# Patient Record
Sex: FEMALE | Race: WHITE | NOT HISPANIC OR LATINO
[De-identification: names, ages, dates, MRNs, and addresses within clinical notes are randomized per-mention and may not be internally consistent; named-entity substitution may affect disease eponyms.]

---

## 2017-02-27 ENCOUNTER — APPOINTMENT (OUTPATIENT)
Dept: GASTROENTEROLOGY | Facility: CLINIC | Age: 72
End: 2017-02-27

## 2017-02-27 VITALS
SYSTOLIC BLOOD PRESSURE: 120 MMHG | WEIGHT: 162 LBS | HEIGHT: 61 IN | TEMPERATURE: 98.1 F | BODY MASS INDEX: 30.58 KG/M2 | DIASTOLIC BLOOD PRESSURE: 80 MMHG

## 2017-02-27 DIAGNOSIS — Z78.9 OTHER SPECIFIED HEALTH STATUS: ICD-10-CM

## 2017-02-27 DIAGNOSIS — E86.0 DEHYDRATION: ICD-10-CM

## 2017-02-27 DIAGNOSIS — Z82.62 FAMILY HISTORY OF OSTEOPOROSIS: ICD-10-CM

## 2017-02-28 ENCOUNTER — TRANSCRIPTION ENCOUNTER (OUTPATIENT)
Age: 72
End: 2017-02-28

## 2017-03-16 ENCOUNTER — APPOINTMENT (OUTPATIENT)
Dept: GASTROENTEROLOGY | Facility: AMBULATORY MEDICAL SERVICES | Age: 72
End: 2017-03-16

## 2017-09-29 ENCOUNTER — EMERGENCY (EMERGENCY)
Facility: HOSPITAL | Age: 72
LOS: 1 days | Discharge: HOME | End: 2017-09-29
Attending: EMERGENCY MEDICINE | Admitting: EMERGENCY MEDICINE
Payer: MEDICARE

## 2017-09-29 VITALS
DIASTOLIC BLOOD PRESSURE: 68 MMHG | OXYGEN SATURATION: 97 % | HEART RATE: 62 BPM | SYSTOLIC BLOOD PRESSURE: 184 MMHG | RESPIRATION RATE: 18 BRPM

## 2017-09-29 VITALS
TEMPERATURE: 98 F | HEART RATE: 66 BPM | SYSTOLIC BLOOD PRESSURE: 189 MMHG | OXYGEN SATURATION: 100 % | DIASTOLIC BLOOD PRESSURE: 59 MMHG | RESPIRATION RATE: 16 BRPM

## 2017-09-29 DIAGNOSIS — Z90.710 ACQUIRED ABSENCE OF BOTH CERVIX AND UTERUS: Chronic | ICD-10-CM

## 2017-09-29 DIAGNOSIS — Z98.89 OTHER SPECIFIED POSTPROCEDURAL STATES: Chronic | ICD-10-CM

## 2017-09-29 DIAGNOSIS — Z90.49 ACQUIRED ABSENCE OF OTHER SPECIFIED PARTS OF DIGESTIVE TRACT: Chronic | ICD-10-CM

## 2017-09-29 DIAGNOSIS — Z96.659 PRESENCE OF UNSPECIFIED ARTIFICIAL KNEE JOINT: Chronic | ICD-10-CM

## 2017-09-29 LAB
ALBUMIN SERPL ELPH-MCNC: 4.3 G/DL — SIGNIFICANT CHANGE UP (ref 3.3–5)
ALP SERPL-CCNC: 62 U/L — SIGNIFICANT CHANGE UP (ref 40–120)
ALT FLD-CCNC: 35 U/L — HIGH (ref 4–33)
APPEARANCE UR: CLEAR — SIGNIFICANT CHANGE UP
AST SERPL-CCNC: 30 U/L — SIGNIFICANT CHANGE UP (ref 4–32)
BASE EXCESS BLDV CALC-SCNC: 5 MMOL/L — SIGNIFICANT CHANGE UP
BASOPHILS # BLD AUTO: 0.04 K/UL — SIGNIFICANT CHANGE UP (ref 0–0.2)
BASOPHILS NFR BLD AUTO: 0.6 % — SIGNIFICANT CHANGE UP (ref 0–2)
BILIRUB SERPL-MCNC: 0.3 MG/DL — SIGNIFICANT CHANGE UP (ref 0.2–1.2)
BILIRUB UR-MCNC: NEGATIVE — SIGNIFICANT CHANGE UP
BLOOD GAS VENOUS - CREATININE: 0.66 MG/DL — SIGNIFICANT CHANGE UP (ref 0.5–1.3)
BLOOD UR QL VISUAL: NEGATIVE — SIGNIFICANT CHANGE UP
BUN SERPL-MCNC: 16 MG/DL — SIGNIFICANT CHANGE UP (ref 7–23)
CALCIUM SERPL-MCNC: 9.3 MG/DL — SIGNIFICANT CHANGE UP (ref 8.4–10.5)
CHLORIDE BLDV-SCNC: 105 MMOL/L — SIGNIFICANT CHANGE UP (ref 96–108)
CHLORIDE SERPL-SCNC: 101 MMOL/L — SIGNIFICANT CHANGE UP (ref 98–107)
CO2 SERPL-SCNC: 29 MMOL/L — SIGNIFICANT CHANGE UP (ref 22–31)
COLOR SPEC: SIGNIFICANT CHANGE UP
CREAT SERPL-MCNC: 0.86 MG/DL — SIGNIFICANT CHANGE UP (ref 0.5–1.3)
EOSINOPHIL # BLD AUTO: 0.13 K/UL — SIGNIFICANT CHANGE UP (ref 0–0.5)
EOSINOPHIL NFR BLD AUTO: 1.8 % — SIGNIFICANT CHANGE UP (ref 0–6)
GAS PNL BLDV: 143 MMOL/L — SIGNIFICANT CHANGE UP (ref 136–146)
GLUCOSE BLDV-MCNC: 104 — HIGH (ref 70–99)
GLUCOSE SERPL-MCNC: 99 MG/DL — SIGNIFICANT CHANGE UP (ref 70–99)
GLUCOSE UR-MCNC: NEGATIVE — SIGNIFICANT CHANGE UP
HCO3 BLDV-SCNC: 26 MMOL/L — SIGNIFICANT CHANGE UP (ref 20–27)
HCT VFR BLD CALC: 36.7 % — SIGNIFICANT CHANGE UP (ref 34.5–45)
HCT VFR BLDV CALC: 56.1 % — HIGH (ref 34.5–45)
HGB BLD-MCNC: 12 G/DL — SIGNIFICANT CHANGE UP (ref 11.5–15.5)
HGB BLDV-MCNC: 18.7 G/DL — HIGH (ref 11.5–15.5)
IMM GRANULOCYTES # BLD AUTO: 0.02 # — SIGNIFICANT CHANGE UP
IMM GRANULOCYTES NFR BLD AUTO: 0.3 % — SIGNIFICANT CHANGE UP (ref 0–1.5)
KETONES UR-MCNC: NEGATIVE — SIGNIFICANT CHANGE UP
LACTATE BLDV-MCNC: 1.2 MMOL/L — SIGNIFICANT CHANGE UP (ref 0.5–2)
LEUKOCYTE ESTERASE UR-ACNC: NEGATIVE — SIGNIFICANT CHANGE UP
LYMPHOCYTES # BLD AUTO: 1.9 K/UL — SIGNIFICANT CHANGE UP (ref 1–3.3)
LYMPHOCYTES # BLD AUTO: 26.8 % — SIGNIFICANT CHANGE UP (ref 13–44)
MCHC RBC-ENTMCNC: 30.3 PG — SIGNIFICANT CHANGE UP (ref 27–34)
MCHC RBC-ENTMCNC: 32.7 % — SIGNIFICANT CHANGE UP (ref 32–36)
MCV RBC AUTO: 92.7 FL — SIGNIFICANT CHANGE UP (ref 80–100)
MONOCYTES # BLD AUTO: 0.43 K/UL — SIGNIFICANT CHANGE UP (ref 0–0.9)
MONOCYTES NFR BLD AUTO: 6.1 % — SIGNIFICANT CHANGE UP (ref 2–14)
NEUTROPHILS # BLD AUTO: 4.56 K/UL — SIGNIFICANT CHANGE UP (ref 1.8–7.4)
NEUTROPHILS NFR BLD AUTO: 64.4 % — SIGNIFICANT CHANGE UP (ref 43–77)
NITRITE UR-MCNC: NEGATIVE — SIGNIFICANT CHANGE UP
NON-SQ EPI CELLS # UR AUTO: <1 — SIGNIFICANT CHANGE UP
NRBC # FLD: 0 — SIGNIFICANT CHANGE UP
PCO2 BLDV: 54 MMHG — HIGH (ref 41–51)
PH BLDV: 7.37 PH — SIGNIFICANT CHANGE UP (ref 7.32–7.43)
PH UR: 6 — SIGNIFICANT CHANGE UP (ref 4.6–8)
PLATELET # BLD AUTO: 198 K/UL — SIGNIFICANT CHANGE UP (ref 150–400)
PMV BLD: 12.2 FL — SIGNIFICANT CHANGE UP (ref 7–13)
PO2 BLDV: < 24 MMHG — LOW (ref 35–40)
POTASSIUM BLDV-SCNC: 4.3 MMOL/L — SIGNIFICANT CHANGE UP (ref 3.4–4.5)
POTASSIUM SERPL-MCNC: 4.5 MMOL/L — SIGNIFICANT CHANGE UP (ref 3.5–5.3)
POTASSIUM SERPL-SCNC: 4.5 MMOL/L — SIGNIFICANT CHANGE UP (ref 3.5–5.3)
PROT SERPL-MCNC: 6.9 G/DL — SIGNIFICANT CHANGE UP (ref 6–8.3)
PROT UR-MCNC: NEGATIVE — SIGNIFICANT CHANGE UP
RBC # BLD: 3.96 M/UL — SIGNIFICANT CHANGE UP (ref 3.8–5.2)
RBC # FLD: 12.9 % — SIGNIFICANT CHANGE UP (ref 10.3–14.5)
SAO2 % BLDV: 36 % — LOW (ref 60–85)
SODIUM SERPL-SCNC: 141 MMOL/L — SIGNIFICANT CHANGE UP (ref 135–145)
SP GR SPEC: 1 — SIGNIFICANT CHANGE UP (ref 1–1.03)
SQUAMOUS # UR AUTO: SIGNIFICANT CHANGE UP
UROBILINOGEN FLD QL: NORMAL E.U. — SIGNIFICANT CHANGE UP (ref 0.1–0.2)
WBC # BLD: 7.08 K/UL — SIGNIFICANT CHANGE UP (ref 3.8–10.5)
WBC # FLD AUTO: 7.08 K/UL — SIGNIFICANT CHANGE UP (ref 3.8–10.5)
WBC UR QL: SIGNIFICANT CHANGE UP (ref 0–?)

## 2017-09-29 PROCEDURE — 74177 CT ABD & PELVIS W/CONTRAST: CPT | Mod: 26

## 2017-09-29 PROCEDURE — 99284 EMERGENCY DEPT VISIT MOD MDM: CPT

## 2017-09-29 RX ORDER — ACETAMINOPHEN 500 MG
1000 TABLET ORAL ONCE
Qty: 0 | Refills: 0 | Status: COMPLETED | OUTPATIENT
Start: 2017-09-29 | End: 2017-09-29

## 2017-09-29 RX ORDER — MORPHINE SULFATE 50 MG/1
2 CAPSULE, EXTENDED RELEASE ORAL ONCE
Qty: 0 | Refills: 0 | Status: DISCONTINUED | OUTPATIENT
Start: 2017-09-29 | End: 2017-09-29

## 2017-09-29 RX ORDER — CYCLOBENZAPRINE HYDROCHLORIDE 10 MG/1
1 TABLET, FILM COATED ORAL
Qty: 10 | Refills: 0
Start: 2017-09-29 | End: 2017-10-04

## 2017-09-29 RX ORDER — ONDANSETRON 8 MG/1
4 TABLET, FILM COATED ORAL ONCE
Qty: 0 | Refills: 0 | Status: COMPLETED | OUTPATIENT
Start: 2017-09-29 | End: 2017-09-29

## 2017-09-29 RX ORDER — SODIUM CHLORIDE 9 MG/ML
1000 INJECTION INTRAMUSCULAR; INTRAVENOUS; SUBCUTANEOUS ONCE
Qty: 0 | Refills: 0 | Status: COMPLETED | OUTPATIENT
Start: 2017-09-29 | End: 2017-09-29

## 2017-09-29 RX ADMIN — MORPHINE SULFATE 2 MILLIGRAM(S): 50 CAPSULE, EXTENDED RELEASE ORAL at 15:10

## 2017-09-29 RX ADMIN — MORPHINE SULFATE 2 MILLIGRAM(S): 50 CAPSULE, EXTENDED RELEASE ORAL at 17:43

## 2017-09-29 RX ADMIN — MORPHINE SULFATE 2 MILLIGRAM(S): 50 CAPSULE, EXTENDED RELEASE ORAL at 16:06

## 2017-09-29 RX ADMIN — ONDANSETRON 4 MILLIGRAM(S): 8 TABLET, FILM COATED ORAL at 15:11

## 2017-09-29 RX ADMIN — Medication 400 MILLIGRAM(S): at 15:10

## 2017-09-29 RX ADMIN — Medication 1000 MILLIGRAM(S): at 16:06

## 2017-09-29 RX ADMIN — SODIUM CHLORIDE 1000 MILLILITER(S): 9 INJECTION INTRAMUSCULAR; INTRAVENOUS; SUBCUTANEOUS at 15:10

## 2017-09-29 NOTE — ED PROVIDER NOTE - MEDICAL DECISION MAKING DETAILS
Lt flank pain with radiation to LLQ as well as LBP - pt sent to er with concern for renal colic - bedsisde us neg for hydro stone, normal size aorta making colic and AAA diagnosis less likley therefore will get contrast ct abd, pain control, labs, iv hydration

## 2017-09-29 NOTE — ED PROVIDER NOTE - ATTENDING CONTRIBUTION TO CARE
Price: 72 yof with resolved diarrhea 3 days ago then with left sided abd pain radiating to the back intermittent, colicky pain, also radiating to lower abd, no fever, no nausea, no vomiting, no trauma, no chills, no urinary symptoms.  On exam, pt is well appearing pain intermittent, BP high, 189/59, clear lungs, normal cardiac, abd soft with left mid abd tn and left cvat.  no suprapubic tn.  US shows no hydronephrosis, and no aaa.  Labs, CT, pain control, r/o diverticulitits

## 2017-09-29 NOTE — ED PROCEDURE NOTE - PROCEDURE ADDITIONAL DETAILS
Limited aorta ultrasound shows no abdominal aortic aneurysm.  See images and report in chart.  Eve [id number]

## 2017-09-29 NOTE — ED PROVIDER NOTE - PLAN OF CARE
PLEASE TAKE PAIN MEDICATIONS AS NEEDED - YOU WILL MAY TAKE IBUPROFEN 600MG EVERY 8HRS FOR NO LONGER THAN 5 DAYS.  FOLLOW UP WITH YOUR DOCTOR WITHIN NEXT 48HRS, RETURN TO ER FOR WORSENING PAIN PLEASE TAKE PAIN MEDICATIONS AS NEEDED - YOU WILL MAY TAKE IBUPROFEN 600MG EVERY 8HRS FOR NO LONGER THAN 5 DAYS.  FOLLOW UP WITH YOUR DOCTOR WITHIN NEXT 48HRS, RETURN TO ER FOR WORSENING PAIN.

## 2017-09-29 NOTE — ED ADULT TRIAGE NOTE - CHIEF COMPLAINT QUOTE
Pt sent by [facility] with L flank area pain, nausea and L sided abd pain since last night.  Denies diarrhea, chills

## 2017-09-29 NOTE — ED PROCEDURE NOTE - PROCEDURE ADDITIONAL DETAILS
Limited Renal US shows no hydronephrosis bilaterally and a non distended bladder.  See images and report in chart.  Eve [id number]

## 2017-09-29 NOTE — ED PROVIDER NOTE - PMH
Back pain    Cholelithiasis    Chronic sinusitis  Deviated nasal septum  Colitis    Fibroids  Uterine  GERD (gastroesophageal reflux disease)    GI bleeding  recent 2/15  - [facility] , denies any blood transfusion  HTN (hypertension)    Lupus    Osteoarthritis

## 2017-09-29 NOTE — ED PROVIDER NOTE - CARE PLAN
Principal Discharge DX:	Flank pain  Instructions for follow-up, activity and diet:	PLEASE TAKE PAIN MEDICATIONS AS NEEDED - YOU WILL MAY TAKE IBUPROFEN 600MG EVERY 8HRS FOR NO LONGER THAN 5 DAYS.  FOLLOW UP WITH YOUR DOCTOR WITHIN NEXT 48HRS, RETURN TO ER FOR WORSENING PAIN Principal Discharge DX:	Flank pain  Instructions for follow-up, activity and diet:	PLEASE TAKE PAIN MEDICATIONS AS NEEDED - YOU WILL MAY TAKE IBUPROFEN 600MG EVERY 8HRS FOR NO LONGER THAN 5 DAYS.  FOLLOW UP WITH YOUR DOCTOR WITHIN NEXT 48HRS, RETURN TO ER FOR WORSENING PAIN.

## 2017-09-29 NOTE — ED PROVIDER NOTE - OBJECTIVE STATEMENT
Pt is 73 y/o female with Pmxh of HTN here for eval of Lt flank pain x 2 days. Pt states that she initially had diarrhea 3 days ago - had few episodes of NB, non-mucousy diarrhea, diarrhea subsided, pt Pt is 71 y/o female with Pmxh of HTN here for eval of Lt flank pain x 2 days. Pt states that she initially had diarrhea 3 days ago - had few episodes of NB, non-mucousy diarrhea, diarrhea subsided, pt started experiencing Lt flank pain - sharp, colicky, radiating to LLQ as well as lower back. Pt denies dysuria, denies urinary urgency or frequency, denies hematuria or hx of kidney stones; pt has been feeling nauseas but denies vomiting, denies fever, chills, cp, sob, denies ext weakness or numbness. pt was seen and evaluated at  today and was sent to er with concern for renal colic. no pain meds taken pta. pcp - Dr Taylor,.

## 2019-08-03 ENCOUNTER — TRANSCRIPTION ENCOUNTER (OUTPATIENT)
Age: 74
End: 2019-08-03

## 2019-09-09 ENCOUNTER — APPOINTMENT (OUTPATIENT)
Dept: GASTROENTEROLOGY | Facility: CLINIC | Age: 74
End: 2019-09-09
Payer: MEDICARE

## 2019-09-09 VITALS
SYSTOLIC BLOOD PRESSURE: 120 MMHG | OXYGEN SATURATION: 98 % | TEMPERATURE: 98.8 F | BODY MASS INDEX: 32.47 KG/M2 | HEART RATE: 72 BPM | DIASTOLIC BLOOD PRESSURE: 70 MMHG | WEIGHT: 172 LBS | HEIGHT: 61 IN

## 2019-09-09 PROCEDURE — 99214 OFFICE O/P EST MOD 30 MIN: CPT

## 2019-09-09 RX ORDER — HYDROCODONE BITARTRATE AND IBUPROFEN 7.5; 2 MG/1; MG/1
7.5-2 TABLET ORAL
Qty: 20 | Refills: 0 | Status: DISCONTINUED | COMMUNITY
Start: 2016-10-31 | End: 2019-09-09

## 2019-09-09 RX ORDER — METRONIDAZOLE 500 MG/1
500 TABLET ORAL TWICE DAILY
Qty: 20 | Refills: 1 | Status: DISCONTINUED | COMMUNITY
Start: 2017-02-27 | End: 2019-09-09

## 2019-09-09 RX ORDER — CIPROFLOXACIN HYDROCHLORIDE 500 MG/1
500 TABLET, FILM COATED ORAL
Qty: 20 | Refills: 1 | Status: DISCONTINUED | COMMUNITY
Start: 2017-02-27 | End: 2019-09-09

## 2019-09-09 RX ORDER — NAPROXEN 500 MG/1
500 TABLET ORAL
Qty: 20 | Refills: 0 | Status: COMPLETED | COMMUNITY
Start: 2016-10-23 | End: 2019-09-09

## 2019-09-09 NOTE — PHYSICAL EXAM
[General Appearance - Alert] : alert [General Appearance - In No Acute Distress] : in no acute distress [Neck Appearance] : the appearance of the neck was normal [Neck Cervical Mass (___cm)] : no neck mass was observed [Thyroid Diffuse Enlargement] : the thyroid was not enlarged [Jugular Venous Distention Increased] : there was no jugular-venous distention [Thyroid Nodule] : there were no palpable thyroid nodules [Auscultation Breath Sounds / Voice Sounds] : lungs were clear to auscultation bilaterally [Heart Rate And Rhythm] : heart rate was normal and rhythm regular [Heart Sounds] : normal S1 and S2 [Heart Sounds Gallop] : no gallops [Murmurs] : no murmurs [Heart Sounds Pericardial Friction Rub] : no pericardial rub [Bowel Sounds] : normal bowel sounds [Abdomen Soft] : soft [Abdomen Tenderness] : non-tender [] : no hepato-splenomegaly [Abdomen Mass (___ Cm)] : no abdominal mass palpated [Cervical Lymph Nodes Enlarged Posterior Bilaterally] : posterior cervical [Supraclavicular Lymph Nodes Enlarged Bilaterally] : supraclavicular [Cervical Lymph Nodes Enlarged Anterior Bilaterally] : anterior cervical [No CVA Tenderness] : no ~M costovertebral angle tenderness [No Spinal Tenderness] : no spinal tenderness [Nail Clubbing] : no clubbing  or cyanosis of the fingernails [Abnormal Walk] : normal gait [Musculoskeletal - Swelling] : no joint swelling seen [Motor Tone] : muscle strength and tone were normal

## 2019-09-09 NOTE — HISTORY OF PRESENT ILLNESS
[de-identified] : 74 year old woman complains of 2 monThs of rectal bleeding in the bowel and the tissue paper . Happens usually in AM bowel movement. She has also experienced poor control of her bowel movements. Colonoscopy in 2017 - 2 small polyps were removed. She had an episode of ischemic colitis in 2017.

## 2019-09-23 ENCOUNTER — APPOINTMENT (OUTPATIENT)
Dept: GASTROENTEROLOGY | Facility: CLINIC | Age: 74
End: 2019-09-23
Payer: MEDICARE

## 2019-09-23 VITALS
RESPIRATION RATE: 17 BRPM | BODY MASS INDEX: 32.1 KG/M2 | SYSTOLIC BLOOD PRESSURE: 120 MMHG | HEIGHT: 61 IN | DIASTOLIC BLOOD PRESSURE: 79 MMHG | TEMPERATURE: 98.5 F | HEART RATE: 84 BPM | OXYGEN SATURATION: 97 % | WEIGHT: 170 LBS

## 2019-09-23 DIAGNOSIS — R19.7 DIARRHEA, UNSPECIFIED: ICD-10-CM

## 2019-09-23 PROCEDURE — 99213 OFFICE O/P EST LOW 20 MIN: CPT

## 2019-09-23 NOTE — HISTORY OF PRESENT ILLNESS
[de-identified] : 74 year old woman seen for bloody diarrhea . She completed the course of PO Cipro and Flagyl. The diarrhea and bleeding have resolved. She is feeling better and is having regular bowel movements. Stool cultures are negative.

## 2019-09-23 NOTE — PHYSICAL EXAM
[General Appearance - Alert] : alert [General Appearance - In No Acute Distress] : in no acute distress [Neck Appearance] : the appearance of the neck was normal [Neck Cervical Mass (___cm)] : no neck mass was observed [Jugular Venous Distention Increased] : there was no jugular-venous distention [Thyroid Diffuse Enlargement] : the thyroid was not enlarged [Thyroid Nodule] : there were no palpable thyroid nodules [Auscultation Breath Sounds / Voice Sounds] : lungs were clear to auscultation bilaterally [Heart Rate And Rhythm] : heart rate was normal and rhythm regular [Heart Sounds] : normal S1 and S2 [Heart Sounds Gallop] : no gallops [Heart Sounds Pericardial Friction Rub] : no pericardial rub [Murmurs] : no murmurs [Abdomen Soft] : soft [Bowel Sounds] : normal bowel sounds [Abdomen Tenderness] : non-tender [] : no hepato-splenomegaly [Abdomen Mass (___ Cm)] : no abdominal mass palpated [Cervical Lymph Nodes Enlarged Posterior Bilaterally] : posterior cervical [Cervical Lymph Nodes Enlarged Anterior Bilaterally] : anterior cervical [Supraclavicular Lymph Nodes Enlarged Bilaterally] : supraclavicular [No CVA Tenderness] : no ~M costovertebral angle tenderness [No Spinal Tenderness] : no spinal tenderness

## 2020-07-09 ENCOUNTER — APPOINTMENT (OUTPATIENT)
Dept: GASTROENTEROLOGY | Facility: CLINIC | Age: 75
End: 2020-07-09
Payer: MEDICARE

## 2020-07-09 VITALS
SYSTOLIC BLOOD PRESSURE: 132 MMHG | OXYGEN SATURATION: 96 % | TEMPERATURE: 98.8 F | HEART RATE: 74 BPM | DIASTOLIC BLOOD PRESSURE: 79 MMHG | HEIGHT: 61 IN | WEIGHT: 170 LBS | BODY MASS INDEX: 32.1 KG/M2 | RESPIRATION RATE: 17 BRPM

## 2020-07-09 DIAGNOSIS — K55.9 VASCULAR DISORDER OF INTESTINE, UNSPECIFIED: ICD-10-CM

## 2020-07-09 DIAGNOSIS — R10.9 UNSPECIFIED ABDOMINAL PAIN: ICD-10-CM

## 2020-07-09 DIAGNOSIS — K52.9 NONINFECTIVE GASTROENTERITIS AND COLITIS, UNSPECIFIED: ICD-10-CM

## 2020-07-09 PROCEDURE — 99214 OFFICE O/P EST MOD 30 MIN: CPT

## 2020-07-09 NOTE — HISTORY OF PRESENT ILLNESS
[de-identified] : 75 year old woman with intermittent rectal bleeding for the past 21/2 months. Usually it is a small amount of bleeding but occasionally it is significant. This is associated with periumbilical pain. she previously had rectal bleeding in 2017 due to ischemic colitis.  She denies fever , chills, melena or hematemesis.

## 2020-07-09 NOTE — PHYSICAL EXAM
[General Appearance - In No Acute Distress] : in no acute distress [General Appearance - Alert] : alert [Neck Appearance] : the appearance of the neck was normal [Neck Cervical Mass (___cm)] : no neck mass was observed [Jugular Venous Distention Increased] : there was no jugular-venous distention [Thyroid Diffuse Enlargement] : the thyroid was not enlarged [Thyroid Nodule] : there were no palpable thyroid nodules [Auscultation Breath Sounds / Voice Sounds] : lungs were clear to auscultation bilaterally [Heart Rate And Rhythm] : heart rate was normal and rhythm regular [Heart Sounds] : normal S1 and S2 [Heart Sounds Gallop] : no gallops [Murmurs] : no murmurs [Bowel Sounds] : normal bowel sounds [Heart Sounds Pericardial Friction Rub] : no pericardial rub [Abdomen Soft] : soft [] : no hepato-splenomegaly [Abdomen Tenderness] : non-tender [Abdomen Mass (___ Cm)] : no abdominal mass palpated [Cervical Lymph Nodes Enlarged Posterior Bilaterally] : posterior cervical [Cervical Lymph Nodes Enlarged Anterior Bilaterally] : anterior cervical [Supraclavicular Lymph Nodes Enlarged Bilaterally] : supraclavicular [No Spinal Tenderness] : no spinal tenderness [No CVA Tenderness] : no ~M costovertebral angle tenderness [Normal Sphincter Tone] : normal sphincter tone [No Rectal Mass] : no rectal mass [Occult Blood Positive] : stool was negative for occult blood

## 2020-07-16 LAB — HEMOCCULT STL QL IA: POSITIVE

## 2020-07-26 ENCOUNTER — APPOINTMENT (OUTPATIENT)
Dept: DISASTER EMERGENCY | Facility: CLINIC | Age: 75
End: 2020-07-26

## 2020-07-26 ENCOUNTER — LABORATORY RESULT (OUTPATIENT)
Age: 75
End: 2020-07-26

## 2020-07-29 ENCOUNTER — APPOINTMENT (OUTPATIENT)
Dept: GASTROENTEROLOGY | Facility: HOSPITAL | Age: 75
End: 2020-07-29
Payer: MEDICARE

## 2020-07-29 ENCOUNTER — RESULT REVIEW (OUTPATIENT)
Age: 75
End: 2020-07-29

## 2020-07-29 ENCOUNTER — OUTPATIENT (OUTPATIENT)
Dept: OUTPATIENT SERVICES | Facility: HOSPITAL | Age: 75
LOS: 1 days | Discharge: HOME | End: 2020-07-29
Payer: MEDICARE

## 2020-07-29 VITALS
DIASTOLIC BLOOD PRESSURE: 76 MMHG | RESPIRATION RATE: 14 BRPM | WEIGHT: 169.98 LBS | SYSTOLIC BLOOD PRESSURE: 125 MMHG | HEART RATE: 71 BPM | OXYGEN SATURATION: 97 % | HEIGHT: 61 IN | TEMPERATURE: 97 F

## 2020-07-29 VITALS
RESPIRATION RATE: 14 BRPM | OXYGEN SATURATION: 96 % | SYSTOLIC BLOOD PRESSURE: 106 MMHG | DIASTOLIC BLOOD PRESSURE: 64 MMHG | HEART RATE: 67 BPM

## 2020-07-29 DIAGNOSIS — Z98.89 OTHER SPECIFIED POSTPROCEDURAL STATES: Chronic | ICD-10-CM

## 2020-07-29 DIAGNOSIS — Z90.710 ACQUIRED ABSENCE OF BOTH CERVIX AND UTERUS: Chronic | ICD-10-CM

## 2020-07-29 DIAGNOSIS — Z96.659 PRESENCE OF UNSPECIFIED ARTIFICIAL KNEE JOINT: Chronic | ICD-10-CM

## 2020-07-29 DIAGNOSIS — Z90.49 ACQUIRED ABSENCE OF OTHER SPECIFIED PARTS OF DIGESTIVE TRACT: Chronic | ICD-10-CM

## 2020-07-29 DIAGNOSIS — R10.9 UNSPECIFIED ABDOMINAL PAIN: ICD-10-CM

## 2020-07-29 PROCEDURE — 88341 IMHCHEM/IMCYTCHM EA ADD ANTB: CPT | Mod: 26

## 2020-07-29 PROCEDURE — 88342 IMHCHEM/IMCYTCHM 1ST ANTB: CPT | Mod: 26

## 2020-07-29 PROCEDURE — 88305 TISSUE EXAM BY PATHOLOGIST: CPT | Mod: 26

## 2020-07-29 PROCEDURE — 43239 EGD BIOPSY SINGLE/MULTIPLE: CPT

## 2020-07-29 PROCEDURE — 88312 SPECIAL STAINS GROUP 1: CPT | Mod: 26

## 2020-07-29 PROCEDURE — 45380 COLONOSCOPY AND BIOPSY: CPT

## 2020-07-29 RX ORDER — SODIUM CHLORIDE 9 MG/ML
1000 INJECTION, SOLUTION INTRAVENOUS
Refills: 0 | Status: DISCONTINUED | OUTPATIENT
Start: 2020-07-29 | End: 2020-08-13

## 2020-07-29 NOTE — ASU PATIENT PROFILE, ADULT - PSH
H/O dilation and curettage  2002  H/O sinus surgery  times 2 ; 2002, 2004  H/O total hysterectomy    H/O total knee replacement  left 6/2011  History of appendectomy    S/P cholecystectomy

## 2020-07-29 NOTE — ASU PATIENT PROFILE, ADULT - VISION (WITH CORRECTIVE LENSES IF THE PATIENT USUALLY WEARS THEM):
Partially impaired: cannot see medication labels or newsprint, but can see obstacles in path, and the surrounding layout; can count fingers at arm's length/GLASSES  FARSIGHTED

## 2020-08-05 LAB — SURGICAL PATHOLOGY STUDY: SIGNIFICANT CHANGE UP

## 2020-08-12 RX ORDER — CIPROFLOXACIN HYDROCHLORIDE 500 MG/1
500 TABLET, FILM COATED ORAL
Qty: 20 | Refills: 1 | Status: COMPLETED | COMMUNITY
Start: 2020-07-09 | End: 2020-08-12

## 2020-08-12 RX ORDER — CIPROFLOXACIN HYDROCHLORIDE 500 MG/1
500 TABLET, FILM COATED ORAL
Qty: 20 | Refills: 1 | Status: COMPLETED | COMMUNITY
Start: 2019-09-09 | End: 2020-08-12

## 2020-08-12 RX ORDER — METRONIDAZOLE 250 MG/1
250 TABLET ORAL 3 TIMES DAILY
Qty: 30 | Refills: 1 | Status: COMPLETED | COMMUNITY
Start: 2019-09-09 | End: 2020-08-12

## 2020-08-12 RX ORDER — METRONIDAZOLE 250 MG/1
250 TABLET ORAL 3 TIMES DAILY
Qty: 30 | Refills: 1 | Status: COMPLETED | COMMUNITY
Start: 2020-07-09 | End: 2020-08-12

## 2020-08-17 ENCOUNTER — APPOINTMENT (OUTPATIENT)
Dept: GASTROENTEROLOGY | Facility: CLINIC | Age: 75
End: 2020-08-17
Payer: MEDICARE

## 2020-08-17 VITALS
DIASTOLIC BLOOD PRESSURE: 82 MMHG | TEMPERATURE: 99 F | HEIGHT: 61 IN | WEIGHT: 169 LBS | OXYGEN SATURATION: 96 % | HEART RATE: 78 BPM | SYSTOLIC BLOOD PRESSURE: 136 MMHG | BODY MASS INDEX: 31.91 KG/M2

## 2020-08-17 DIAGNOSIS — Z09 ENCOUNTER FOR FOLLOW-UP EXAMINATION AFTER COMPLETED TREATMENT FOR CONDITIONS OTHER THAN MALIGNANT NEOPLASM: ICD-10-CM

## 2020-08-17 DIAGNOSIS — K31.7 POLYP OF STOMACH AND DUODENUM: ICD-10-CM

## 2020-08-17 DIAGNOSIS — K62.1 RECTAL POLYP: ICD-10-CM

## 2020-08-17 PROCEDURE — 99213 OFFICE O/P EST LOW 20 MIN: CPT

## 2020-08-17 RX ORDER — SODIUM SULFATE, POTASSIUM SULFATE, MAGNESIUM SULFATE 17.5; 3.13; 1.6 G/ML; G/ML; G/ML
17.5-3.13-1.6 SOLUTION, CONCENTRATE ORAL
Qty: 1 | Refills: 0 | Status: DISCONTINUED | COMMUNITY
Start: 2020-07-09 | End: 2020-08-17

## 2020-08-17 NOTE — ASSESSMENT
[FreeTextEntry1] : Patient referred to Dr. Merrill for rectal EUS and gastric EUS. Discussed with the patient.

## 2020-08-17 NOTE — HISTORY OF PRESENT ILLNESS
[de-identified] : 89 year old woman with tectal bleeding. She  underwent colonoscopy on 7/29 that showed a rectal mass. Biopsy is positive for high grade dysplasia. EGD shoes benign gastric polyps but possible gastric mass. Biopsy is negative. She has a small amount of rectal bleeding when she wipes.

## 2020-08-26 DIAGNOSIS — Z01.818 ENCOUNTER FOR OTHER PREPROCEDURAL EXAMINATION: ICD-10-CM

## 2020-08-28 ENCOUNTER — APPOINTMENT (OUTPATIENT)
Dept: DISASTER EMERGENCY | Facility: CLINIC | Age: 75
End: 2020-08-28

## 2020-08-29 LAB — SARS-COV-2 N GENE NPH QL NAA+PROBE: NOT DETECTED

## 2020-08-31 ENCOUNTER — OUTPATIENT (OUTPATIENT)
Dept: OUTPATIENT SERVICES | Facility: HOSPITAL | Age: 75
LOS: 1 days | End: 2020-08-31
Payer: MEDICARE

## 2020-08-31 ENCOUNTER — RESULT REVIEW (OUTPATIENT)
Age: 75
End: 2020-08-31

## 2020-08-31 ENCOUNTER — APPOINTMENT (OUTPATIENT)
Dept: GASTROENTEROLOGY | Facility: HOSPITAL | Age: 75
End: 2020-08-31

## 2020-08-31 VITALS
DIASTOLIC BLOOD PRESSURE: 67 MMHG | HEART RATE: 71 BPM | WEIGHT: 169.98 LBS | HEIGHT: 61 IN | TEMPERATURE: 97 F | RESPIRATION RATE: 17 BRPM | SYSTOLIC BLOOD PRESSURE: 152 MMHG | OXYGEN SATURATION: 97 %

## 2020-08-31 VITALS
RESPIRATION RATE: 18 BRPM | DIASTOLIC BLOOD PRESSURE: 77 MMHG | HEART RATE: 64 BPM | SYSTOLIC BLOOD PRESSURE: 113 MMHG | OXYGEN SATURATION: 97 %

## 2020-08-31 DIAGNOSIS — Z98.89 OTHER SPECIFIED POSTPROCEDURAL STATES: Chronic | ICD-10-CM

## 2020-08-31 DIAGNOSIS — Z90.49 ACQUIRED ABSENCE OF OTHER SPECIFIED PARTS OF DIGESTIVE TRACT: Chronic | ICD-10-CM

## 2020-08-31 DIAGNOSIS — Z90.710 ACQUIRED ABSENCE OF BOTH CERVIX AND UTERUS: Chronic | ICD-10-CM

## 2020-08-31 DIAGNOSIS — Z96.659 PRESENCE OF UNSPECIFIED ARTIFICIAL KNEE JOINT: Chronic | ICD-10-CM

## 2020-08-31 DIAGNOSIS — K62.5 HEMORRHAGE OF ANUS AND RECTUM: ICD-10-CM

## 2020-08-31 PROCEDURE — 88312 SPECIAL STAINS GROUP 1: CPT | Mod: 26

## 2020-08-31 PROCEDURE — 45391 COLONOSCOPY W/ENDOSCOPE US: CPT | Mod: XU

## 2020-08-31 PROCEDURE — 43259 EGD US EXAM DUODENUM/JEJUNUM: CPT | Mod: XU

## 2020-08-31 PROCEDURE — 45341 SIGMOIDOSCOPY W/ULTRASOUND: CPT | Mod: GC

## 2020-08-31 PROCEDURE — 88312 SPECIAL STAINS GROUP 1: CPT

## 2020-08-31 PROCEDURE — 43239 EGD BIOPSY SINGLE/MULTIPLE: CPT | Mod: 59

## 2020-08-31 PROCEDURE — 88341 IMHCHEM/IMCYTCHM EA ADD ANTB: CPT

## 2020-08-31 PROCEDURE — 88305 TISSUE EXAM BY PATHOLOGIST: CPT | Mod: 26

## 2020-08-31 PROCEDURE — 43254 EGD ENDO MUCOSAL RESECTION: CPT

## 2020-08-31 PROCEDURE — 43254 EGD ENDO MUCOSAL RESECTION: CPT | Mod: GC

## 2020-08-31 PROCEDURE — 88305 TISSUE EXAM BY PATHOLOGIST: CPT

## 2020-08-31 PROCEDURE — 88342 IMHCHEM/IMCYTCHM 1ST ANTB: CPT | Mod: 26

## 2020-08-31 PROCEDURE — 45380 COLONOSCOPY AND BIOPSY: CPT

## 2020-08-31 PROCEDURE — 88341 IMHCHEM/IMCYTCHM EA ADD ANTB: CPT | Mod: 26

## 2020-08-31 PROCEDURE — 43259 EGD US EXAM DUODENUM/JEJUNUM: CPT | Mod: GC

## 2020-08-31 PROCEDURE — 45331 SIGMOIDOSCOPY AND BIOPSY: CPT | Mod: GC,59

## 2020-08-31 NOTE — PRE-ANESTHESIA EVALUATION ADULT - NSANTHOSAYNRD_GEN_A_CORE
No. MARCELA screening performed.  STOP BANG Legend: 0-2 = LOW Risk; 3-4 = INTERMEDIATE Risk; 5-8 = HIGH Risk

## 2020-08-31 NOTE — PRE PROCEDURE NOTE - PRE PROCEDURE EVALUATION
Attending Physician:            FARRAH                Procedure: EUS (upper and lower) and rectal EMR    Indication for Procedure: gastric cardia polyp and rectal lesion.  ________________________________________________________  PAST MEDICAL & SURGICAL HISTORY:  Fibroids: Uterine  Chronic sinusitis: Deviated nasal septum  Cholelithiasis  Osteoarthritis  GERD (gastroesophageal reflux disease)  Back pain  GI bleeding: recent 2/15  - [facility] , denies any blood transfusion  Colitis  Lupus  HTN (hypertension)  H/O dilation and curettage: 2002  H/O sinus surgery: times 2 ; 2002, 2004  H/O total knee replacement: left 6/2011  H/O total hysterectomy  S/P cholecystectomy  History of appendectomy    ALLERGIES:  codeine (Vomiting)  penicillin (Hives)    HOME MEDICATIONS:  acetaminophen-oxyCODONE 325 mg-5 mg oral tablet: 2 tab(s) orally every 4 hours, As needed, Moderate Pain  Bystolic 5 mg oral tablet: 1 tab(s) orally once a day in am  quinapril 40 mg oral tablet: 1 tab(s) orally once a day in am    AICD/PPM: [ ] yes   [ ] no    PERTINENT LAB DATA:                      PHYSICAL EXAMINATION:    Height (cm): 154.94  Weight (kg): 77.1  BMI (kg/m2): 32.1  BSA (m2): 1.76T(C): --  HR: --  BP: --  RR: --  SpO2: --    Constitutional: NAD  HEENT: PERRLA, EOMI,    Neck:  No JVD  Respiratory: CTAB/L  Cardiovascular: S1 and S2  Gastrointestinal: BS+, soft, NT/ND  Extremities: No peripheral edema  Neurological: A/O x 3, no focal deficits  Psychiatric: Normal mood, normal affect  Skin: No rashes    ASA Class: I [ ]  II [x ]  III [ ]  IV [ ]    COMMENTS:    The patient is a suitable candidate for the planned procedure unless box checked [ ]  No, explain:

## 2020-09-02 ENCOUNTER — APPOINTMENT (OUTPATIENT)
Dept: MRI IMAGING | Facility: IMAGING CENTER | Age: 75
End: 2020-09-02
Payer: MEDICARE

## 2020-09-02 ENCOUNTER — OUTPATIENT (OUTPATIENT)
Dept: OUTPATIENT SERVICES | Facility: HOSPITAL | Age: 75
LOS: 1 days | End: 2020-09-02
Payer: MEDICARE

## 2020-09-02 DIAGNOSIS — Z98.89 OTHER SPECIFIED POSTPROCEDURAL STATES: Chronic | ICD-10-CM

## 2020-09-02 DIAGNOSIS — Z96.659 PRESENCE OF UNSPECIFIED ARTIFICIAL KNEE JOINT: Chronic | ICD-10-CM

## 2020-09-02 DIAGNOSIS — Z90.710 ACQUIRED ABSENCE OF BOTH CERVIX AND UTERUS: Chronic | ICD-10-CM

## 2020-09-02 DIAGNOSIS — Z90.49 ACQUIRED ABSENCE OF OTHER SPECIFIED PARTS OF DIGESTIVE TRACT: Chronic | ICD-10-CM

## 2020-09-02 DIAGNOSIS — C20 MALIGNANT NEOPLASM OF RECTUM: ICD-10-CM

## 2020-09-02 LAB — SURGICAL PATHOLOGY STUDY: SIGNIFICANT CHANGE UP

## 2020-09-02 PROCEDURE — A9585: CPT

## 2020-09-02 PROCEDURE — 72197 MRI PELVIS W/O & W/DYE: CPT | Mod: 26

## 2020-09-02 PROCEDURE — 72197 MRI PELVIS W/O & W/DYE: CPT

## 2020-09-10 ENCOUNTER — APPOINTMENT (OUTPATIENT)
Dept: COLORECTAL SURGERY | Facility: CLINIC | Age: 75
End: 2020-09-10
Payer: MEDICARE

## 2020-09-10 VITALS
RESPIRATION RATE: 16 BRPM | OXYGEN SATURATION: 97 % | TEMPERATURE: 98.6 F | DIASTOLIC BLOOD PRESSURE: 77 MMHG | BODY MASS INDEX: 32.1 KG/M2 | WEIGHT: 170 LBS | HEIGHT: 61 IN | SYSTOLIC BLOOD PRESSURE: 130 MMHG | HEART RATE: 64 BPM

## 2020-09-10 DIAGNOSIS — K62.5 HEMORRHAGE OF ANUS AND RECTUM: ICD-10-CM

## 2020-09-10 PROCEDURE — 99204 OFFICE O/P NEW MOD 45 MIN: CPT | Mod: 25

## 2020-09-10 PROCEDURE — 45330 DIAGNOSTIC SIGMOIDOSCOPY: CPT

## 2020-09-10 NOTE — HISTORY OF PRESENT ILLNESS
[FreeTextEntry1] : Patient is 76 yo female, presents rectal cancer. Patient had routine colonoscopy earlier this year which showed a sessile polyp, was referred to Dr. Mas 8/31/2020 for removal, Dr. 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 Patient has BRB on tissue and in toilet bowel after a BM for the last 6 months. With some bowel movements, stool is long and thin, softer consistency.. Daily BM. Occasional abdominal pain in LLQ. \par \par Hx of colitis. denies familiy history of colon cancer.

## 2020-09-10 NOTE — REVIEW OF SYSTEMS
[Joint Pain] : joint pain [As Noted in HPI] : as noted in HPI [Negative] : Heme/Lymph [FreeTextEntry5] : HTN [FreeTextEntry9] : back pain

## 2020-09-10 NOTE — PHYSICAL EXAM
[Normal Breath Sounds] : Normal breath sounds [Normal Rate and Rhythm] : normal rate and rhythm [Alert] : alert [Normal Heart Sounds] : normal heart sounds [Oriented to Place] : oriented to place [Oriented to Person] : oriented to person [Calm] : calm [Oriented to Time] : oriented to time [de-identified] : well appearing  [de-identified] : round, +BS [de-identified] : NC/AT [de-identified] : +ROM/RALPH [de-identified] : intact

## 2020-09-10 NOTE — ASSESSMENT
[FreeTextEntry1] : Newly diagnosed rectal cancer\par -I had a long discussion with the patient and her children About her diagnosis of rectal cancer and treatment options. Staging workup has been performed except for CT scans of the chest abdomen and pelvis. MRI shows T2 disease and endorectal ultrasound showed possible submucosal involvement likely consistent with T1 invasion. Sensitivity of tumor stage below T2 is generally higher with endorectal ultrasound. I favor that this is likely a T1 polyp.\par -We'll obtain CT scan of the chest abdomen and pelvis to rule out metastatic disease\par -We will obtain CEA level\par -Patient to be presented at multidisciplinary tumor board. My inclination is that this T1 polyp should be removed transanally with possible TAMIS. The patient will be scheduled At her convenience after medical clearance..\par -Risks and benefits were reviewed.  We also discussed the possibility of upstaging of the tumor on final pathology. This could involve treatment with chemotherapy and radiation\par -All questions answered\par

## 2020-09-10 NOTE — CONSULT LETTER
[Consult Letter:] : I had the pleasure of evaluating your patient, [unfilled]. [Dear  ___] : Dear  [unfilled], [Please see my note below.] : Please see my note below. [Consult Closing:] : Thank you very much for allowing me to participate in the care of this patient.  If you have any questions, please do not hesitate to contact me. [Sincerely,] : Sincerely, [FreeTextEntry2] : Francisco Merrill [FreeTextEntry3] : Sathya Gordillo MD FACS\par Chief Colon and Rectal Surgery\par [facility]

## 2020-09-15 ENCOUNTER — OUTPATIENT (OUTPATIENT)
Dept: OUTPATIENT SERVICES | Facility: HOSPITAL | Age: 75
LOS: 1 days | End: 2020-09-15
Payer: MEDICARE

## 2020-09-15 ENCOUNTER — APPOINTMENT (OUTPATIENT)
Dept: DISASTER EMERGENCY | Facility: CLINIC | Age: 75
End: 2020-09-15

## 2020-09-15 ENCOUNTER — RESULT REVIEW (OUTPATIENT)
Age: 75
End: 2020-09-15

## 2020-09-15 ENCOUNTER — APPOINTMENT (OUTPATIENT)
Dept: CT IMAGING | Facility: IMAGING CENTER | Age: 75
End: 2020-09-15
Payer: MEDICARE

## 2020-09-15 VITALS
TEMPERATURE: 97 F | DIASTOLIC BLOOD PRESSURE: 80 MMHG | HEART RATE: 67 BPM | SYSTOLIC BLOOD PRESSURE: 144 MMHG | OXYGEN SATURATION: 99 % | WEIGHT: 164.91 LBS | HEIGHT: 59 IN

## 2020-09-15 DIAGNOSIS — Z90.710 ACQUIRED ABSENCE OF BOTH CERVIX AND UTERUS: Chronic | ICD-10-CM

## 2020-09-15 DIAGNOSIS — Z96.659 PRESENCE OF UNSPECIFIED ARTIFICIAL KNEE JOINT: Chronic | ICD-10-CM

## 2020-09-15 DIAGNOSIS — Z98.890 OTHER SPECIFIED POSTPROCEDURAL STATES: Chronic | ICD-10-CM

## 2020-09-15 DIAGNOSIS — K62.1 RECTAL POLYP: ICD-10-CM

## 2020-09-15 DIAGNOSIS — Z98.89 OTHER SPECIFIED POSTPROCEDURAL STATES: Chronic | ICD-10-CM

## 2020-09-15 DIAGNOSIS — C20 MALIGNANT NEOPLASM OF RECTUM: ICD-10-CM

## 2020-09-15 DIAGNOSIS — Z90.49 ACQUIRED ABSENCE OF OTHER SPECIFIED PARTS OF DIGESTIVE TRACT: Chronic | ICD-10-CM

## 2020-09-15 DIAGNOSIS — I10 ESSENTIAL (PRIMARY) HYPERTENSION: ICD-10-CM

## 2020-09-15 LAB
ALBUMIN SERPL ELPH-MCNC: 4.8 G/DL — SIGNIFICANT CHANGE UP (ref 3.3–5)
ALP SERPL-CCNC: 68 U/L — SIGNIFICANT CHANGE UP (ref 40–120)
ALT FLD-CCNC: 16 U/L — SIGNIFICANT CHANGE UP (ref 4–33)
ANION GAP SERPL CALC-SCNC: 11 MMO/L — SIGNIFICANT CHANGE UP (ref 7–14)
AST SERPL-CCNC: 20 U/L — SIGNIFICANT CHANGE UP (ref 4–32)
BILIRUB SERPL-MCNC: 0.3 MG/DL — SIGNIFICANT CHANGE UP (ref 0.2–1.2)
BLD GP AB SCN SERPL QL: NEGATIVE — SIGNIFICANT CHANGE UP
BUN SERPL-MCNC: 22 MG/DL — SIGNIFICANT CHANGE UP (ref 7–23)
CALCIUM SERPL-MCNC: 10.1 MG/DL — SIGNIFICANT CHANGE UP (ref 8.4–10.5)
CHLORIDE SERPL-SCNC: 97 MMOL/L — LOW (ref 98–107)
CO2 SERPL-SCNC: 31 MMOL/L — SIGNIFICANT CHANGE UP (ref 22–31)
CREAT SERPL-MCNC: 0.93 MG/DL — SIGNIFICANT CHANGE UP (ref 0.5–1.3)
GLUCOSE SERPL-MCNC: 82 MG/DL — SIGNIFICANT CHANGE UP (ref 70–99)
HCT VFR BLD CALC: 37.7 % — SIGNIFICANT CHANGE UP (ref 34.5–45)
HGB BLD-MCNC: 11.9 G/DL — SIGNIFICANT CHANGE UP (ref 11.5–15.5)
MCHC RBC-ENTMCNC: 28.6 PG — SIGNIFICANT CHANGE UP (ref 27–34)
MCHC RBC-ENTMCNC: 31.6 % — LOW (ref 32–36)
MCV RBC AUTO: 90.6 FL — SIGNIFICANT CHANGE UP (ref 80–100)
NRBC # FLD: 0 K/UL — SIGNIFICANT CHANGE UP (ref 0–0)
PLATELET # BLD AUTO: 265 K/UL — SIGNIFICANT CHANGE UP (ref 150–400)
PMV BLD: 11.7 FL — SIGNIFICANT CHANGE UP (ref 7–13)
POTASSIUM SERPL-MCNC: 3.8 MMOL/L — SIGNIFICANT CHANGE UP (ref 3.5–5.3)
POTASSIUM SERPL-SCNC: 3.8 MMOL/L — SIGNIFICANT CHANGE UP (ref 3.5–5.3)
PROT SERPL-MCNC: 7.5 G/DL — SIGNIFICANT CHANGE UP (ref 6–8.3)
RBC # BLD: 4.16 M/UL — SIGNIFICANT CHANGE UP (ref 3.8–5.2)
RBC # FLD: 13.5 % — SIGNIFICANT CHANGE UP (ref 10.3–14.5)
RH IG SCN BLD-IMP: POSITIVE — SIGNIFICANT CHANGE UP
SODIUM SERPL-SCNC: 139 MMOL/L — SIGNIFICANT CHANGE UP (ref 135–145)
WBC # BLD: 8.04 K/UL — SIGNIFICANT CHANGE UP (ref 3.8–10.5)
WBC # FLD AUTO: 8.04 K/UL — SIGNIFICANT CHANGE UP (ref 3.8–10.5)

## 2020-09-15 PROCEDURE — 93010 ELECTROCARDIOGRAM REPORT: CPT

## 2020-09-15 PROCEDURE — 74177 CT ABD & PELVIS W/CONTRAST: CPT

## 2020-09-15 PROCEDURE — 74177 CT ABD & PELVIS W/CONTRAST: CPT | Mod: 26

## 2020-09-15 PROCEDURE — 71260 CT THORAX DX C+: CPT

## 2020-09-15 PROCEDURE — 82565 ASSAY OF CREATININE: CPT

## 2020-09-15 PROCEDURE — 71260 CT THORAX DX C+: CPT | Mod: 26

## 2020-09-15 NOTE — H&P PST ADULT - NSICDXPROBLEM_GEN_ALL_CORE_FT
PROBLEM DIAGNOSES  Problem: Rectal polyp  Assessment and Plan:     Problem: Hypertension  Assessment and Plan:        PROBLEM DIAGNOSES  Problem: Rectal polyp  Assessment and Plan: Transanal Excision Rectal Mass  Pre op instructions reviewed with pt ; including pepcid ; pt verbalized good understanding of pre op instructions  Dr Arroyo to provide pre op medical evaluation prior to surgery    Problem: Hypertension  Assessment and Plan: Pt to take Bystolic and Quinapril dos

## 2020-09-15 NOTE — H&P PST ADULT - LAST CARDIAC ANGIOGRAM/IMAGING
IN [facility] ? [facility] IN [facility] ? [facility] pt denies stent placement

## 2020-09-15 NOTE — H&P PST ADULT - NSICDXFAMILYHX_GEN_ALL_CORE_FT
FAMILY HISTORY:  Family history of coronary artery disease, father    Sibling  Still living? Unknown  Family history of CVA, Age at diagnosis: Age Unknown

## 2020-09-15 NOTE — H&P PST ADULT - ACTIVITY
pt denies routine exercise due to back pain; pt walked 0.5 mile 3-4 x week last 2 weeks ago due to back pain

## 2020-09-15 NOTE — H&P PST ADULT - NEGATIVE NEUROLOGICAL SYMPTOMS
no transient paralysis/no weakness/no paresthesias/no generalized seizures/no focal seizures/no syncope/no tremors

## 2020-09-15 NOTE — H&P PST ADULT - LYMPHATIC
supraclavicular L/anterior cervical L/anterior cervical R/posterior cervical L/posterior cervical R/supraclavicular R

## 2020-09-15 NOTE — H&P PST ADULT - HISTORY OF PRESENT ILLNESS
Pt is a 75 y.o. female ; pt reports h/o rectal bleeding > 5 months ago. Pt s/p Colonoscopy " there is a flat polyp " Pt with repeat colonoscopy ; unable to remove polyp ; referred to surgeon ; pt now presents for Transanal Excision of Rectal mass     Pt reports h/o abdominal pain Pt is a 75 y.o. female ; pt reports h/o rectal bleeding > 5 months ago. Pt s/p Colonoscopy " there is a flat polyp " Pt with repeat colonoscopy ; unable to remove polyp ; referred to surgeon ; pt now presents for Transanal Excision of Rectal mass

## 2020-09-15 NOTE — H&P PST ADULT - NSICDXPASTSURGICALHX_GEN_ALL_CORE_FT
PAST SURGICAL HISTORY:  H/O dilation and curettage 2002    H/O sinus surgery times 2 ; 2002, 2004    H/O total hysterectomy     H/O total knee replacement left 6/2011    History of appendectomy     S/P cholecystectomy     S/P lumbar laminectomy 2015

## 2020-09-15 NOTE — H&P PST ADULT - NSICDXPASTMEDICALHX_GEN_ALL_CORE_FT
PAST MEDICAL HISTORY:  Back pain s/p Laminectomy 2015    Cholelithiasis     Chronic sinusitis Deviated nasal septum    Colitis 2015    Fibroids Uterine    GERD (gastroesophageal reflux disease) pt denies rx    GI bleeding recent 2/15  - [facility] , denies any blood transfusion    HTN (hypertension)     Lupus 9/15/2020 ; pt denies    Osteoarthritis

## 2020-09-16 LAB — SARS-COV-2 N GENE NPH QL NAA+PROBE: NOT DETECTED

## 2020-09-17 ENCOUNTER — TRANSCRIPTION ENCOUNTER (OUTPATIENT)
Age: 75
End: 2020-09-17

## 2020-09-17 NOTE — ASU PATIENT PROFILE, ADULT - REASON FOR ADMISSION, PROFILE
nonverbal cues (demo/gestures)/verbal cues/2 person assist rectal bleed rectal surgery for rectal cancer

## 2020-09-17 NOTE — ASU PATIENT PROFILE, ADULT - VISION (WITH CORRECTIVE LENSES IF THE PATIENT USUALLY WEARS THEM):
Normal vision: sees adequately in most situations; can see medication labels, newsprint Glasses in PACU locker #/Partially impaired: cannot see medication labels or newsprint, but can see obstacles in path, and the surrounding layout; can count fingers at arm's length Glasses in PACU locker #1/Partially impaired: cannot see medication labels or newsprint, but can see obstacles in path, and the surrounding layout; can count fingers at arm's length

## 2020-09-18 ENCOUNTER — INPATIENT (INPATIENT)
Facility: HOSPITAL | Age: 75
LOS: 0 days | Discharge: HOME | End: 2020-09-19
Attending: STUDENT IN AN ORGANIZED HEALTH CARE EDUCATION/TRAINING PROGRAM | Admitting: STUDENT IN AN ORGANIZED HEALTH CARE EDUCATION/TRAINING PROGRAM
Payer: MEDICARE

## 2020-09-18 ENCOUNTER — APPOINTMENT (OUTPATIENT)
Dept: COLORECTAL SURGERY | Facility: HOSPITAL | Age: 75
End: 2020-09-18
Payer: MEDICARE

## 2020-09-18 ENCOUNTER — RESULT REVIEW (OUTPATIENT)
Age: 75
End: 2020-09-18

## 2020-09-18 VITALS
WEIGHT: 167.99 LBS | SYSTOLIC BLOOD PRESSURE: 162 MMHG | HEART RATE: 67 BPM | RESPIRATION RATE: 15 BRPM | TEMPERATURE: 98 F | OXYGEN SATURATION: 98 % | DIASTOLIC BLOOD PRESSURE: 60 MMHG | HEIGHT: 61 IN

## 2020-09-18 DIAGNOSIS — Z90.49 ACQUIRED ABSENCE OF OTHER SPECIFIED PARTS OF DIGESTIVE TRACT: Chronic | ICD-10-CM

## 2020-09-18 DIAGNOSIS — Z98.89 OTHER SPECIFIED POSTPROCEDURAL STATES: Chronic | ICD-10-CM

## 2020-09-18 DIAGNOSIS — K62.1 RECTAL POLYP: ICD-10-CM

## 2020-09-18 DIAGNOSIS — Z98.890 OTHER SPECIFIED POSTPROCEDURAL STATES: Chronic | ICD-10-CM

## 2020-09-18 DIAGNOSIS — Z90.710 ACQUIRED ABSENCE OF BOTH CERVIX AND UTERUS: Chronic | ICD-10-CM

## 2020-09-18 DIAGNOSIS — Z96.659 PRESENCE OF UNSPECIFIED ARTIFICIAL KNEE JOINT: Chronic | ICD-10-CM

## 2020-09-18 LAB — CEA SERPL-MCNC: 4 NG/ML — HIGH (ref 1–3.8)

## 2020-09-18 PROCEDURE — 88305 TISSUE EXAM BY PATHOLOGIST: CPT | Mod: 26

## 2020-09-18 PROCEDURE — 45300 PROCTOSIGMOIDOSCOPY DX: CPT

## 2020-09-18 PROCEDURE — 45172 EXC RECT TUM TRANSANAL FULL: CPT

## 2020-09-18 PROCEDURE — 45171 EXC RECT TUM TRANSANAL PART: CPT | Mod: 59

## 2020-09-18 PROCEDURE — 88342 IMHCHEM/IMCYTCHM 1ST ANTB: CPT | Mod: 26

## 2020-09-18 PROCEDURE — 88341 IMHCHEM/IMCYTCHM EA ADD ANTB: CPT | Mod: 26

## 2020-09-18 RX ORDER — OXYCODONE HYDROCHLORIDE 5 MG/1
2.5 TABLET ORAL EVERY 6 HOURS
Refills: 0 | Status: DISCONTINUED | OUTPATIENT
Start: 2020-09-18 | End: 2020-09-18

## 2020-09-18 RX ORDER — IBUPROFEN 200 MG
600 TABLET ORAL EVERY 6 HOURS
Refills: 0 | Status: DISCONTINUED | OUTPATIENT
Start: 2020-09-18 | End: 2020-09-18

## 2020-09-18 RX ORDER — QUINAPRIL HYDROCHLORIDE 40 MG/1
1 TABLET, FILM COATED ORAL
Qty: 0 | Refills: 0 | DISCHARGE

## 2020-09-18 RX ORDER — DEXAMETHASONE 0.5 MG/5ML
8 ELIXIR ORAL ONCE
Refills: 0 | Status: DISCONTINUED | OUTPATIENT
Start: 2020-09-18 | End: 2020-09-18

## 2020-09-18 RX ORDER — LISINOPRIL 2.5 MG/1
20 TABLET ORAL DAILY
Refills: 0 | Status: DISCONTINUED | OUTPATIENT
Start: 2020-09-18 | End: 2020-09-19

## 2020-09-18 RX ORDER — ONDANSETRON 8 MG/1
4 TABLET, FILM COATED ORAL ONCE
Refills: 0 | Status: COMPLETED | OUTPATIENT
Start: 2020-09-18 | End: 2020-09-18

## 2020-09-18 RX ORDER — NEBIVOLOL HYDROCHLORIDE 5 MG/1
5 TABLET ORAL DAILY
Refills: 0 | Status: DISCONTINUED | OUTPATIENT
Start: 2020-09-18 | End: 2020-09-19

## 2020-09-18 RX ORDER — HEPARIN SODIUM 5000 [USP'U]/ML
5000 INJECTION INTRAVENOUS; SUBCUTANEOUS EVERY 8 HOURS
Refills: 0 | Status: DISCONTINUED | OUTPATIENT
Start: 2020-09-18 | End: 2020-09-19

## 2020-09-18 RX ORDER — PROCHLORPERAZINE MALEATE 5 MG
10 TABLET ORAL ONCE
Refills: 0 | Status: DISCONTINUED | OUTPATIENT
Start: 2020-09-18 | End: 2020-09-19

## 2020-09-18 RX ORDER — HYDROMORPHONE HYDROCHLORIDE 2 MG/ML
0.5 INJECTION INTRAMUSCULAR; INTRAVENOUS; SUBCUTANEOUS
Refills: 0 | Status: DISCONTINUED | OUTPATIENT
Start: 2020-09-18 | End: 2020-09-18

## 2020-09-18 RX ORDER — ACETAMINOPHEN 500 MG
975 TABLET ORAL EVERY 6 HOURS
Refills: 0 | Status: DISCONTINUED | OUTPATIENT
Start: 2020-09-18 | End: 2020-09-19

## 2020-09-18 RX ORDER — IBUPROFEN 200 MG
400 TABLET ORAL EVERY 6 HOURS
Refills: 0 | Status: DISCONTINUED | OUTPATIENT
Start: 2020-09-18 | End: 2020-09-19

## 2020-09-18 RX ORDER — OXYCODONE HYDROCHLORIDE 5 MG/1
2.5 TABLET ORAL EVERY 4 HOURS
Refills: 0 | Status: DISCONTINUED | OUTPATIENT
Start: 2020-09-18 | End: 2020-09-19

## 2020-09-18 RX ORDER — INFLUENZA VIRUS VACCINE 15; 15; 15; 15 UG/.5ML; UG/.5ML; UG/.5ML; UG/.5ML
0.5 SUSPENSION INTRAMUSCULAR ONCE
Refills: 0 | Status: DISCONTINUED | OUTPATIENT
Start: 2020-09-18 | End: 2020-09-19

## 2020-09-18 RX ORDER — SODIUM CHLORIDE 9 MG/ML
1000 INJECTION, SOLUTION INTRAVENOUS
Refills: 0 | Status: DISCONTINUED | OUTPATIENT
Start: 2020-09-18 | End: 2020-09-18

## 2020-09-18 RX ORDER — NEBIVOLOL HYDROCHLORIDE 5 MG/1
1 TABLET ORAL
Qty: 0 | Refills: 0 | DISCHARGE

## 2020-09-18 RX ADMIN — HEPARIN SODIUM 5000 UNIT(S): 5000 INJECTION INTRAVENOUS; SUBCUTANEOUS at 21:32

## 2020-09-18 RX ADMIN — Medication 600 MILLIGRAM(S): at 17:52

## 2020-09-18 RX ADMIN — ONDANSETRON 4 MILLIGRAM(S): 8 TABLET, FILM COATED ORAL at 16:15

## 2020-09-18 RX ADMIN — Medication 975 MILLIGRAM(S): at 19:04

## 2020-09-18 RX ADMIN — HYDROMORPHONE HYDROCHLORIDE 0.5 MILLIGRAM(S): 2 INJECTION INTRAMUSCULAR; INTRAVENOUS; SUBCUTANEOUS at 16:15

## 2020-09-18 RX ADMIN — Medication 600 MILLIGRAM(S): at 18:00

## 2020-09-18 RX ADMIN — HYDROMORPHONE HYDROCHLORIDE 0.5 MILLIGRAM(S): 2 INJECTION INTRAMUSCULAR; INTRAVENOUS; SUBCUTANEOUS at 16:51

## 2020-09-18 NOTE — BRIEF OPERATIVE NOTE - OPERATION/FINDINGS
Posterior Right rectal mass identified, excised with gross margins.   Additional margin taken at distal portion.   Closure of defect with vicryl.   Sigmoidoscopy performed at conclusion of case, suture line viable, scope easily passed through excision area.

## 2020-09-18 NOTE — CHART NOTE - NSCHARTNOTEFT_GEN_A_CORE
POST-OPERATIVE NOTE    Subjective:  Patient is s/p transanal resection of rectal mass. Patient seen and examined at bedside. Patient endorsed an episode of pain and nausea without emesis earlier which has since resolved. Patient endorses currently feeling well, denying fevers, chills, nausea, vomiting, chest pain, shortness of breath, or surgical pain. Recovering appropriately.     Vital Signs Last 24 Hrs  T(C): 36.5 (18 Sep 2020 15:15), Max: 36.8 (18 Sep 2020 08:24)  T(F): 97.7 (18 Sep 2020 15:15), Max: 98.2 (18 Sep 2020 08:24)  HR: 61 (18 Sep 2020 18:30) (52 - 77)  BP: 113/61 (18 Sep 2020 18:30) (108/58 - 162/60)  BP(mean): 69 (18 Sep 2020 18:30) (58 - 102)  RR: 23 (18 Sep 2020 18:30) (10 - 23)  SpO2: 90% (18 Sep 2020 18:30) (90% - 98%)  I&O's Detail    18 Sep 2020 07:01  -  18 Sep 2020 19:22  --------------------------------------------------------  IN:    Lactated Ringers: 60 mL    Lactated Ringers: 80 mL  Total IN: 140 mL    OUT:    Indwelling Catheter - Urethral (mL): 85 mL  Total OUT: 85 mL    Total NET: 55 mL        heparin   Injectable 5000  lisinopril 20  nebivolol 5    PAST MEDICAL & SURGICAL HISTORY:  Fibroids  Uterine    Chronic sinusitis  Deviated nasal septum    Cholelithiasis    Osteoarthritis    GERD (gastroesophageal reflux disease)  pt denies rx    Back pain  s/p Laminectomy 2015    GI bleeding  recent 2/15  - [facility] , denies any blood transfusion    Colitis  2015    Lupus  9/15/2020 ; pt denies    HTN (hypertension)    S/P lumbar laminectomy  2015    H/O dilation and curettage  2002    H/O sinus surgery  times 2 ; 2002, 2004    H/O total knee replacement  left 6/2011    H/O total hysterectomy    S/P cholecystectomy    History of appendectomy          Physical Exam:  General: NAD, resting comfortably in bed, awake, alert, and oriented x3.  Pulmonary: Nonlabored breathing, no respiratory distress  Cardiovascular: NSR  GI: Abdomen soft, nondistended, non-tender without guarding or rebound tenderness. Surgical dressing over anus clean, dry, and intact without tenderness to palpation. No erythema or exudates.  Extremities: WWP, moving spontaneously.  Drains: Monreal with clear urine.      LABS:            CAPILLARY BLOOD GLUCOSE          Radiology and Additional Studies:    Assessment:  The patient is a 75y Female who is now several hours post-op from a transanal excision of rectal mass. Patient is hemodynamically stable with pain well controlled, recovering appropriately in PACU.    Plan:  - Pain control as needed  - Diet: Advance as tolerated to LRD  - DVT ppx  - OOB and ambulating as tolerated  - F/u AM labs

## 2020-09-19 ENCOUNTER — TRANSCRIPTION ENCOUNTER (OUTPATIENT)
Age: 75
End: 2020-09-19

## 2020-09-19 VITALS
OXYGEN SATURATION: 97 % | SYSTOLIC BLOOD PRESSURE: 134 MMHG | HEART RATE: 74 BPM | DIASTOLIC BLOOD PRESSURE: 54 MMHG | RESPIRATION RATE: 16 BRPM | TEMPERATURE: 98 F

## 2020-09-19 RX ORDER — IBUPROFEN 200 MG
1 TABLET ORAL
Qty: 0 | Refills: 0 | DISCHARGE
Start: 2020-09-19

## 2020-09-19 RX ORDER — POLYETHYLENE GLYCOL 3350 17 G/17G
17 POWDER, FOR SOLUTION ORAL
Qty: 119 | Refills: 0
Start: 2020-09-19 | End: 2020-09-25

## 2020-09-19 RX ORDER — PSYLLIUM SEED (WITH DEXTROSE)
1 POWDER (GRAM) ORAL
Qty: 7 | Refills: 0
Start: 2020-09-19 | End: 2020-09-25

## 2020-09-19 RX ORDER — METOPROLOL TARTRATE 50 MG
12.5 TABLET ORAL EVERY 12 HOURS
Refills: 0 | Status: DISCONTINUED | OUTPATIENT
Start: 2020-09-19 | End: 2020-09-19

## 2020-09-19 RX ORDER — PSYLLIUM SEED (WITH DEXTROSE)
5 POWDER (GRAM) ORAL
Qty: 35 | Refills: 0
Start: 2020-09-19 | End: 2020-09-25

## 2020-09-19 RX ORDER — OXYCODONE HYDROCHLORIDE 5 MG/1
1 TABLET ORAL
Qty: 5 | Refills: 0
Start: 2020-09-19 | End: 2020-09-23

## 2020-09-19 RX ORDER — ACETAMINOPHEN 500 MG
3 TABLET ORAL
Qty: 0 | Refills: 0 | DISCHARGE
Start: 2020-09-19

## 2020-09-19 RX ORDER — SODIUM CHLORIDE 9 MG/ML
500 INJECTION INTRAMUSCULAR; INTRAVENOUS; SUBCUTANEOUS ONCE
Refills: 0 | Status: DISCONTINUED | OUTPATIENT
Start: 2020-09-19 | End: 2020-09-19

## 2020-09-19 RX ADMIN — Medication 12.5 MILLIGRAM(S): at 18:08

## 2020-09-19 RX ADMIN — Medication 975 MILLIGRAM(S): at 14:30

## 2020-09-19 RX ADMIN — Medication 400 MILLIGRAM(S): at 18:07

## 2020-09-19 RX ADMIN — Medication 12.5 MILLIGRAM(S): at 07:48

## 2020-09-19 RX ADMIN — Medication 400 MILLIGRAM(S): at 18:42

## 2020-09-19 RX ADMIN — Medication 975 MILLIGRAM(S): at 15:30

## 2020-09-19 RX ADMIN — Medication 400 MILLIGRAM(S): at 13:00

## 2020-09-19 RX ADMIN — Medication 975 MILLIGRAM(S): at 08:04

## 2020-09-19 RX ADMIN — Medication 400 MILLIGRAM(S): at 06:28

## 2020-09-19 RX ADMIN — HEPARIN SODIUM 5000 UNIT(S): 5000 INJECTION INTRAVENOUS; SUBCUTANEOUS at 14:59

## 2020-09-19 RX ADMIN — Medication 400 MILLIGRAM(S): at 12:00

## 2020-09-19 RX ADMIN — LISINOPRIL 20 MILLIGRAM(S): 2.5 TABLET ORAL at 06:28

## 2020-09-19 RX ADMIN — Medication 975 MILLIGRAM(S): at 01:29

## 2020-09-19 RX ADMIN — HEPARIN SODIUM 5000 UNIT(S): 5000 INJECTION INTRAVENOUS; SUBCUTANEOUS at 06:27

## 2020-09-19 RX ADMIN — Medication 400 MILLIGRAM(S): at 00:40

## 2020-09-19 NOTE — PROGRESS NOTE ADULT - ASSESSMENT
75y Female POD1 s/p transanal excision of rectal mass. Patient is hemodynamically stable with pain well controlled, recovering appropriately on floor.    D/C muniz  TOV @ 4pm  Reg Diet  miralax qd x 1week   metamucil qd x 1week  pain control   possible d/c today    A team surgery [id number]

## 2020-09-19 NOTE — PROGRESS NOTE ADULT - SUBJECTIVE AND OBJECTIVE BOX
GENERAL SURGERY PROGRESS NOTE    SUBJECTIVE  Patient seen and examined. No acute events overnight. Reports tolerating diet without nausea, vomiting, she is passing flatus, not yet having bowel movements, voiding without issues via muniz, have been ambulating and out of bed. Denies fever, chills, SOB, chest pain.         OBJECTIVE    PHYSICAL EXAM  General: Appears well, NAD  CHEST: breathing comfortably  CV: appears well perfused  GI: soft, nontender, nondistended, no rebound or guarding, rectal packing in place c/d/i   Extremities: Grossly symmetric    T(C): 36.4 (09-19-20 @ 06:24), Max: 36.7 (09-18-20 @ 21:26)  HR: 68 (09-19-20 @ 06:24) (52 - 77)  BP: 114/52 (09-19-20 @ 06:24) (108/58 - 138/70)  RR: 16 (09-19-20 @ 06:24) (10 - 23)  SpO2: 96% (09-19-20 @ 06:24) (90% - 98%)    09-18-20 @ 07:01  -  09-19-20 @ 07:00  --------------------------------------------------------  IN: 860 mL / OUT: 660 mL / NET: 200 mL        MEDICATIONS  acetaminophen   Tablet .. 975 milliGRAM(s) Oral every 6 hours  heparin   Injectable 5000 Unit(s) SubCutaneous every 8 hours  ibuprofen  Tablet. 400 milliGRAM(s) Oral every 6 hours  influenza   Vaccine 0.5 milliLiter(s) IntraMuscular once  lisinopril 20 milliGRAM(s) Oral daily  metoprolol tartrate 12.5 milliGRAM(s) Oral every 12 hours  oxyCODONE    IR 2.5 milliGRAM(s) Oral every 4 hours PRN  prochlorperazine   Injectable 10 milliGRAM(s) IV Push once PRN      LABS

## 2020-09-19 NOTE — DISCHARGE NOTE PROVIDER - HOSPITAL COURSE
This is a 75 year old female who has a history of a rectal bleeding about 5 months ago.  During colonoscopy a flat polyp was seen and could not be resected.  Patient was referred to Dr. Gordillo and scheduled to come in for surgery on 9/18 for a transanal excision of a rectal mass.  Surgery was uneventful and her post operative course was uncomplicated.    9/19: Regular diet was started and Indwelling urinary catheter was discontinued. Patient passed her trial of void and was discharged on miralax and metamucil for 1 week.    On day of discharge, the patient was tolerating diet, ambulating well and pain controlled.

## 2020-09-19 NOTE — DISCHARGE NOTE PROVIDER - NSDCFUADDINST_GEN_ALL_CORE_FT
Please take both the miralax and metamucil once daily for 1week.  PLEASE DO NOT TAKE THE OXYCODONE UNLESS YOU ARE STILL IN SEVERE PAIN AFTER TAKING BOTH TYLENOL AND IBUPROFEN.  DO NOT TAKE MORE THAN ONE OXYCODONE DAILY.

## 2020-09-19 NOTE — DISCHARGE NOTE PROVIDER - NSDCCPTREATMENT_GEN_ALL_CORE_FT
PRINCIPAL PROCEDURE  Procedure: Excision of rectal tumor by transanal approach  Findings and Treatment:

## 2020-09-19 NOTE — DISCHARGE NOTE PROVIDER - NSDCMRMEDTOKEN_GEN_ALL_CORE_FT
acetaminophen 325 mg oral tablet: 3 tab(s) orally every 6 hours  Bystolic 5 mg oral tablet: 1 tab(s) orally once a day in AM  ibuprofen 400 mg oral tablet: 1 tab(s) orally every 6 hours  Metamucil 400 mg oral capsule: 1 cap(s) orally once a day MDD:1 capsule  MiraLax oral powder for reconstitution: 17 gram(s) orally once a day MDD:17g  oxyCODONE 5 mg oral tablet: 1 tab(s) orally once a day MDD:1 tablet  quinapril 40 mg oral tablet: 1 tab(s) orally once a day in AM   acetaminophen 325 mg oral tablet: 3 tab(s) orally every 6 hours  Bystolic 5 mg oral tablet: 1 tab(s) orally once a day in AM  ibuprofen 400 mg oral tablet: 1 tab(s) orally every 6 hours  Metamucil 400 mg oral capsule: 5 cap(s) orally once a day MDD:5 capsules  MiraLax oral powder for reconstitution: 17 gram(s) orally once a day MDD:17g  oxyCODONE 5 mg oral tablet: 1 tab(s) orally once a day MDD:1 tablet  quinapril 40 mg oral tablet: 1 tab(s) orally once a day in AM

## 2020-09-19 NOTE — DISCHARGE NOTE PROVIDER - CARE PROVIDER_API CALL
Sathya Gordillo  COLON/RECTAL SURGERY  [facility], [address] [place] [id number]  Phone: [phone]  Fax: [phone]  Follow Up Time: 1 week

## 2020-09-19 NOTE — DISCHARGE NOTE PROVIDER - NSDCCPCAREPLAN_GEN_ALL_CORE_FT
PRINCIPAL DISCHARGE DIAGNOSIS  Diagnosis: Rectal polyp  Assessment and Plan of Treatment: transanal excision of rectal mass

## 2020-09-19 NOTE — DISCHARGE NOTE NURSING/CASE MANAGEMENT/SOCIAL WORK - PATIENT PORTAL LINK FT
You can access the FollowMyHealth Patient Portal offered by [facility] by registering at the following website: [email or web address]. By joining [facility]’s FollowMyHealth portal, you will also be able to view your health information using other applications (apps) compatible with our system.

## 2020-09-29 ENCOUNTER — APPOINTMENT (OUTPATIENT)
Dept: COLORECTAL SURGERY | Facility: CLINIC | Age: 75
End: 2020-09-29
Payer: MEDICARE

## 2020-09-29 VITALS
OXYGEN SATURATION: 94 % | DIASTOLIC BLOOD PRESSURE: 81 MMHG | SYSTOLIC BLOOD PRESSURE: 165 MMHG | TEMPERATURE: 97.5 F | HEART RATE: 68 BPM

## 2020-09-29 PROCEDURE — 99024 POSTOP FOLLOW-UP VISIT: CPT

## 2020-09-29 NOTE — HISTORY OF PRESENT ILLNESS
[FreeTextEntry1] : Status post transanal excision of rectal cancer. Patient progressing well. Improved pain. Patient with mild incontinence postoperatively which is resolved. No blood per rectum

## 2020-09-29 NOTE — ASSESSMENT
[FreeTextEntry1] : Rectal cancer\par -Awaiting pathology, preliminary report consistent with T1 lesion with questionable lymphovascular invasion\par -High fiber diet\par -Will contact patient with final pathology

## 2020-10-02 LAB — SURGICAL PATHOLOGY STUDY: SIGNIFICANT CHANGE UP

## 2020-10-12 ENCOUNTER — OUTPATIENT (OUTPATIENT)
Dept: OUTPATIENT SERVICES | Facility: HOSPITAL | Age: 75
LOS: 1 days | Discharge: HOME | End: 2020-10-12

## 2020-10-12 DIAGNOSIS — Z98.89 OTHER SPECIFIED POSTPROCEDURAL STATES: Chronic | ICD-10-CM

## 2020-10-12 DIAGNOSIS — Z90.49 ACQUIRED ABSENCE OF OTHER SPECIFIED PARTS OF DIGESTIVE TRACT: Chronic | ICD-10-CM

## 2020-10-12 DIAGNOSIS — Z90.710 ACQUIRED ABSENCE OF BOTH CERVIX AND UTERUS: Chronic | ICD-10-CM

## 2020-10-12 DIAGNOSIS — Z98.890 OTHER SPECIFIED POSTPROCEDURAL STATES: Chronic | ICD-10-CM

## 2020-10-12 DIAGNOSIS — C18.1 MALIGNANT NEOPLASM OF APPENDIX: ICD-10-CM

## 2020-10-12 DIAGNOSIS — Z96.659 PRESENCE OF UNSPECIFIED ARTIFICIAL KNEE JOINT: Chronic | ICD-10-CM

## 2020-10-14 ENCOUNTER — APPOINTMENT (OUTPATIENT)
Dept: HEMATOLOGY ONCOLOGY | Facility: CLINIC | Age: 75
End: 2020-10-14
Payer: MEDICARE

## 2020-10-14 VITALS
WEIGHT: 166.89 LBS | BODY MASS INDEX: 31.51 KG/M2 | SYSTOLIC BLOOD PRESSURE: 156 MMHG | HEART RATE: 56 BPM | TEMPERATURE: 97.2 F | HEIGHT: 61.18 IN | RESPIRATION RATE: 16 BRPM | OXYGEN SATURATION: 96 % | DIASTOLIC BLOOD PRESSURE: 85 MMHG

## 2020-10-14 DIAGNOSIS — C20 MALIGNANT NEOPLASM OF RECTUM: ICD-10-CM

## 2020-10-14 PROCEDURE — 99354: CPT

## 2020-10-14 PROCEDURE — 99205 OFFICE O/P NEW HI 60 MIN: CPT

## 2020-10-14 RX ORDER — ROSUVASTATIN CALCIUM 5 MG/1
5 TABLET, FILM COATED ORAL DAILY
Refills: 0 | Status: ACTIVE | COMMUNITY
Start: 2020-10-14

## 2020-10-15 PROBLEM — C20 RECTAL CANCER: Status: ACTIVE | Noted: 2020-08-31

## 2020-10-15 NOTE — CONSULT LETTER
[Dear  ___] : Dear  [unfilled], [Consult Letter:] : I had the pleasure of evaluating your patient, [unfilled]. [Consult Closing:] : Thank you very much for allowing me to participate in the care of this patient.  If you have any questions, please do not hesitate to contact me. [Please see my note below.] : Please see my note below. [Sincerely,] : Sincerely, [FreeTextEntry3] : Félix Tillman MD, FACP \par  of Medicine \par Division of Hematology/Oncology\par [facility]\par [facility] \par [address]\[place] [address]\par Tel: [phone]\par Fax: [phone]\par \par \par

## 2020-10-15 NOTE — HISTORY OF PRESENT ILLNESS
[Disease: _____________________] : Disease: [unfilled] [T: ___] : T[unfilled] [N: ___] : N[unfilled] [M: ___] : M[unfilled] [AJCC Stage: ____] : AJCC Stage: [unfilled] [de-identified] : She states she has a couple of BMs a day but stool is regular each time without blood.  She does take Miralax prn constipation (last dose last week).  She has recurrent sinus infections (last 3-4 months ago).  She otherwise remains very active and denies any n/v, pain, neuropathy, fevers/chills.  She has lost about 2-3 pounds since last 5 months. She sometimes notes dizziness when she gets up and has mild R groin pain. \par \par \par  [de-identified] : Lymphovascular invasion [de-identified] : 76 y/o F with PMH of HTN, HLD, multinodular thyroid s/p biopsy benign (Dr Nance) who noted rectal bleeding about 5 months ago, bright red and thought it was hemorrhoids.  She underwent colonoscopy with Dr Castro which showed on 5 mm polyp at hepatic flexure and a flat likely malignant polyp in the distal rectum and could not be resected so she was referred to Dr Merrill and sent for CT c/a/p 9/15/2020 which showed No evidence of metastatic disease in the chest, abdomen or pelvis, multinodular thyroid with substernal extension and mild tracheal deviation.  An MRI rectal 9/2/2020 showed A 1.5 cm (CC dimension) polypoid mid rectal mass. Stage: T2 N0 (without any suspicious extra mesorectal nodes).  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 Patient was referred to Dr. Gordillo and admitted to [facility] from 9/18-9/19/2020 and underwent transanal excision of a rectal mass.  Surgery was uneventful and her post operative course was uncomplicated.  Her pre-op cea was 4 ng/mL and pathology showed invasive moderately-differentiated adenocarcinoma 3 x 1.7 x 0.5 cm, +LV invasion, +PN invasion, neg margins staged T1Nx, MS stable \par \par Sx:  hysterectomy - fibroids 1991; uterint prolapse repair 2001, cholecystectomy for infection 2007; L knee replacement 2011; lumbar laminectomy 2015 \par Fhx:  Maternal grandfather colon; maternal grandmother stomach, maternal aunts x 3 Breast cancer in 50's or 60's\par lives with ; two boys and one dtr; retired  \par \par PMD:  Dr Bennett \par \par She presents on 10/14/2020 to discuss next steps. \par \par Colonoscopy - 2017; Dr Zimmernman Normal

## 2020-10-15 NOTE — REVIEW OF SYSTEMS
[Fatigue] : fatigue [Recent Change In Weight] : ~T recent weight change [Constipation] : constipation [Diarrhea] : diarrhea [Negative] : Allergic/Immunologic [Fever] : no fever [Chills] : no chills [Shortness Of Breath] : no shortness of breath [SOB on Exertion] : no shortness of breath during exertion [Cough] : no cough [Vomiting] : no vomiting [Abdominal Pain] : no abdominal pain [Dysuria] : no dysuria [Difficulty Walking] : no difficulty walking [de-identified] : intermittent back pain

## 2020-10-15 NOTE — REASON FOR VISIT
[Initial Consultation] : an initial consultation [Other: _____] : [unfilled] [FreeTextEntry2] : Colorectal Cancer

## 2020-11-15 ENCOUNTER — RX RENEWAL (OUTPATIENT)
Age: 75
End: 2020-11-15

## 2020-12-15 ENCOUNTER — RX RENEWAL (OUTPATIENT)
Age: 75
End: 2020-12-15

## 2020-12-25 NOTE — PATIENT PROFILE ADULT - VISION (WITH CORRECTIVE LENSES IF THE PATIENT USUALLY WEARS THEM):
Normal vision: sees adequately in most situations; can see medication labels, newsprint
normal sinus rhythm

## 2021-01-13 ENCOUNTER — RX RENEWAL (OUTPATIENT)
Age: 76
End: 2021-01-13

## 2021-04-13 ENCOUNTER — RX RENEWAL (OUTPATIENT)
Age: 76
End: 2021-04-13

## 2021-06-08 NOTE — DISCHARGE NOTE PROVIDER - EXTENDED VTE YES NO FOR MLM ENOXAPARIN
This visit was performed via live two-way video with patient's verbal consent.   Clinician Location: Home.  Patient Location: Home.  Stephanie is in [place] and identity has been established.   She was informed that consent to treat includes permission to submit charges to the applicable insurance on file. Stephanie was advised regarding the potential risk inherent in video/telephone visits, as the assessment may be limited due to what can be heard on the phone or seen on the screen which potentially results in an incomplete assessment.     Follow Up Note Medication Assisted Treatment    Chief Complaint:  Chief Complaint   Patient presents with   • Video Visit   • Medication Management       History of Present Illness:  Stephanie Fernando is seen for follow up of medication assisted treatment with Suboxone. She continues to do well on 12 mg of Suboxone daily.  She will be one year sober at the end of this month.  She denies any cravings slips or lapses.  She is tolerating Suboxone well without side effects. She reports her anxiety is adequately controlled with Lexapro and she is tolerating it well.  She continues to work through her relationship with [employer].  She has applied for a few jobs.  She is hopefull to get third shift job at [employer].  She continues to try to reduce her nicotine use.  She would like to remain on Suboxone and denies any barriers to treatment.    Problem List:  Patient Active Problem List   Diagnosis   • Pain in joint, multiple sites   • Fibromyalgia syndrome   • Mechanical back pain   • Dental disease   • Hot flashes   • Insomnia   • Attention-deficit hyperactivity disorder, predominantly inattentive type   • Chronic nausea   • Depression with anxiety   • Obesity, Class II, BMI 35-39.9, no comorbidity   • Neurocardiogenic syncope   • Subscapular bursitis   • Opioid use disorder, severe, in early remission, on maintenance therapy (CMS/[facility])   • Current nicotine use   • Generalized anxiety disorder        Medications, Allergies and Nurse's note were reviewed.       Physical Exam:  There were no vitals filed for this visit.    She appears well and in no distress.  Mental Status Exam:   General Appearance: Unremarkable  Orientation: Fully oriented.  Affect: appropriate  Mood: normal  Motor Activity: normal  Gait/Posture: normal  Thought Processes: appropriate   Psychotic: None  Speech: normal  Suicidal Ideations: absent  Homicidal Ideations: absent  Memory: intact  Attention Span/Concentration: good  Judgment: good  Insight: good  Reliability: good    UDS:  Positive for buprenorphine otherwise negative.  Will get a buprenorphine level on her today.      Impression:  Opioid use disorder, severe, in early remission on maintenance therapy.  Tobacco use disorder, ready to quit and working on weaning.  Generalized anxiety disorder with episode of panic. Stable on Lexapro and interested in therapy.  Past history of ADHD, depression, and history of trauma. Mood stable with treatment and on Lexapro.  Psychosocial stressors have improved with sobriety. Currently unemployed looking for work.    Plan:  I congratulated her on her continued sobriety.  I have encouraged her to continue looking for employment opportunities.  I recommended she remain on Suboxone and Lexapro.  I will see her back in 1 month for follow-up and sooner as needed.  PDMP reviewed; therapy appropriate, no aberrant behavior identified, prescription given.    Risk of Relapse: Low    Risk of assessment for harm to self/suicide risk: Not at significant or imminent risk    Risk of assessment of harm to others: Not at significant or imminent risk    Follow up: 1 month     ,

## 2021-09-18 ENCOUNTER — RX RENEWAL (OUTPATIENT)
Age: 76
End: 2021-09-18

## 2022-01-10 NOTE — H&P PST ADULT - SPO2 (%)
Called to advise.
Ordered.
Pt calling needs Covid test ordered ---started Saturday with vomitting-diarrhea-cough-congestion-sore throat---pt asking for covid test order--please let her know when in systme. Thanks.
99

## 2022-02-15 ENCOUNTER — RX RENEWAL (OUTPATIENT)
Age: 77
End: 2022-02-15

## 2022-03-17 ENCOUNTER — RX RENEWAL (OUTPATIENT)
Age: 77
End: 2022-03-17

## 2022-04-12 NOTE — ASU PATIENT PROFILE, ADULT - REASON FOR ADMISSION, PROFILE
Airway  Performed by: Blayne Santillan  Authorized by: Loreta Zuñiga MD     Final Airway Type:  Endotracheal airway  Final Endotracheal Airway*:  PEBBLES tube  ETT Size (mm)*:  6.5  Cuff*:  Regular  Technique Used for Successful ETT Placement:  Direct laryngoscopy  Devices/Methods Used in Placement*:  Nasal ETT  Intubation Procedure*:  Preoxygenation, Atraumatic, Dentition Unchanged and Pharynx Clear  Insertion Site:  Left naris  Blade Type*:  MAC  Blade Size*:  3  Measured from*:  Nares  Placement Verified by: auscultation, capnometry and equal breath sounds    Glottic View*:  1 - full view of glottis  Attempts*:  1   Patient Identified, Procedure confirmed, Emergency equipment available and Safety protocols followed  Location:  OR  Urgency:  Elective  Indications for Airway Management:  Anesthesia and airway protection  Sedation Level:  Anesthetized  Mask Difficulty Assessment:  1 - vent by mask  Performed By:  Resident and Anesthesiologist  Anesthesiologist:  Loreta Zuñiga MD  Resident:  Blayne Santillan  Start Time: 4/12/2022 7:50 AM         rectal bleed

## 2022-04-18 ENCOUNTER — RX RENEWAL (OUTPATIENT)
Age: 77
End: 2022-04-18

## 2022-04-18 RX ORDER — OMEPRAZOLE 20 MG/1
20 CAPSULE, DELAYED RELEASE ORAL DAILY
Qty: 30 | Refills: 3 | Status: ACTIVE | COMMUNITY
Start: 2020-07-09 | End: 1900-01-01

## 2022-04-18 NOTE — PRE-OP CHECKLIST - PATIENT'S PERSONAL PROPERTY GIVEN TO
on unit on unit/PACU Locker # PACU Locker #1/on unit Protopic Pregnancy And Lactation Text: This medication is Pregnancy Category C. It is unknown if this medication is excreted in breast milk when applied topically.

## 2022-05-13 NOTE — PRE-OP CHECKLIST - AS BP NONINV SITE
[de-identified] : RN left upper arm

## 2022-05-27 ENCOUNTER — APPOINTMENT (OUTPATIENT)
Dept: ORTHOPEDIC SURGERY | Facility: CLINIC | Age: 77
End: 2022-05-27
Payer: MEDICARE

## 2022-05-27 VITALS — HEIGHT: 61 IN | BODY MASS INDEX: 31.15 KG/M2 | WEIGHT: 165 LBS

## 2022-05-27 PROCEDURE — 99203 OFFICE O/P NEW LOW 30 MIN: CPT

## 2022-05-27 NOTE — REASON FOR VISIT
[FreeTextEntry2] : right shoulder pain after sustaining a fall on 5/26/22. seen at [facility] same day.

## 2022-05-27 NOTE — PHYSICAL EXAM
[Right] : right shoulder [Outside films reviewed] : Outside films reviewed [Fracture] : Fracture [FreeTextEntry1] : minimally displaced greater tuberosity fx

## 2022-05-27 NOTE — DISCUSSION/SUMMARY
[de-identified] : 77f with minimally displaced greater tuberosity fracture\par 1) c/w sling\par 2) rtc 2 weeks repeat x-ray\par 3) discussed activity modifications\par \par Entered by Veena Fields acting as scribe.\par \par

## 2022-05-27 NOTE — HISTORY OF PRESENT ILLNESS
[Sudden] : sudden [8] : 8 [7] : 7 [Dull/Aching] : dull/aching [Constant] : constant [Sleep] : sleep [Meds] : meds [Ice] : ice [Exercising] : exercising [Lying in bed] : lying in bed [Retired] : Work status: retired [] : Post Surgical Visit: no [FreeTextEntry1] : right shoulder [FreeTextEntry3] : 5/26/22 [FreeTextEntry5] : patient sustained a fall on 5/26/22.  [de-identified] : 5/26/22 [de-identified] : urgent care [de-identified] : xrays

## 2022-06-10 ENCOUNTER — APPOINTMENT (OUTPATIENT)
Dept: ORTHOPEDIC SURGERY | Facility: CLINIC | Age: 77
End: 2022-06-10
Payer: MEDICARE

## 2022-06-10 VITALS — WEIGHT: 158 LBS | HEIGHT: 61 IN | BODY MASS INDEX: 29.83 KG/M2

## 2022-06-10 DIAGNOSIS — S42.254A NONDISPLACED FRACTURE OF GREATER TUBEROSITY OF RIGHT HUMERUS, INITIAL ENCOUNTER FOR CLOSED FRACTURE: ICD-10-CM

## 2022-06-10 PROCEDURE — 73030 X-RAY EXAM OF SHOULDER: CPT | Mod: RT

## 2022-06-10 PROCEDURE — 99024 POSTOP FOLLOW-UP VISIT: CPT

## 2022-06-10 RX ORDER — TRAMADOL HYDROCHLORIDE 50 MG/1
50 TABLET, COATED ORAL
Qty: 30 | Refills: 0 | Status: ACTIVE | COMMUNITY
Start: 2022-05-27 | End: 1900-01-01

## 2022-06-10 NOTE — PHYSICAL EXAM
[Right] : right shoulder [Outside films reviewed] : Outside films reviewed [Fracture] : Fracture [] : motor and sensory intact distally [FreeTextEntry1] : displaced greater tuberosity fx

## 2022-06-10 NOTE — DISCUSSION/SUMMARY
[de-identified] : 77f with displaced greater tuberosity fracture with interval displacement - Noncompliant with sling\par 1) Discussed the importance of compliance with the sling, demonstrated proper position. \par 2) Advised for consult with Dr. Schaefer for further eval to discuss treatment options. \par \par \par Entered by Veena Fields acting as scribe.\par \par

## 2022-06-10 NOTE — HISTORY OF PRESENT ILLNESS
[Sudden] : sudden [8] : 8 [7] : 7 [Dull/Aching] : dull/aching [Localized] : localized [Tightness] : tightness [Constant] : constant [Sleep] : sleep [Meds] : meds [Ice] : ice [Exercising] : exercising [Lying in bed] : lying in bed [Retired] : Work status: retired [de-identified] : 06/10/22: Here to f/up right greater tuberosity fracture. Injury 05.26.22. Last seen 05.27.22, was given a sling. States continued right shoulder pain.  [] : Post Surgical Visit: no [FreeTextEntry1] : right shoulder [FreeTextEntry3] : 5/26/22 [FreeTextEntry5] : patient sustained a fall on 5/26/22.  [FreeTextEntry6] : stiffness [de-identified] : 5/26/22 [de-identified] : urgent care [de-identified] : xrays

## 2022-06-24 ENCOUNTER — APPOINTMENT (OUTPATIENT)
Dept: ORTHOPEDIC SURGERY | Facility: CLINIC | Age: 77
End: 2022-06-24

## 2023-06-20 NOTE — DISCHARGE NOTE NURSING/CASE MANAGEMENT/SOCIAL WORK - NSDCPEPTCAREGIVEDUMATLIST _GEN_ALL_CORE
Coronavirus/COVID19 Prednisone Counseling:  I discussed with the patient the risks of prolonged use of prednisone including but not limited to weight gain, insomnia, osteoporosis, mood changes, diabetes, susceptibility to infection, glaucoma and high blood pressure.  In cases where prednisone use is prolonged, patients should be monitored with blood pressure checks, serum glucose levels and an eye exam.  Additionally, the patient may need to be placed on GI prophylaxis, PCP prophylaxis, and calcium and vitamin D supplementation and/or a bisphosphonate.  The patient verbalized understanding of the proper use and the possible adverse effects of prednisone.  All of the patient's questions and concerns were addressed.

## 2024-04-11 NOTE — ED PROVIDER NOTE - RESPIRATORY DISTRESS
Paul Haines,    Thank you for allowing [facility] to manage your care.    I sent your prescriptions to your pharmacy.    I made a care coordination referral, they will be calling in approximately 1 week to set up your appointment.  If you do not hear from them, please call the specialty number on your after visit.     If you have any questions or concerns, please feel free to call us at [phone].    Sincerely,    Dr. Winters    Did you know?      You can schedule a video visit for follow-up appointments as well as future appointments for certain conditions.  Please see the below link.     [email or web address]    If you have not already done so,  I encourage you to sign up for [facility] ([email or web address]).  This will allow you to review your results, securely communicate with a provider, and schedule virtual visits as well.    
no

## 2024-08-15 ENCOUNTER — APPOINTMENT (OUTPATIENT)
Dept: ORTHOPEDIC SURGERY | Facility: CLINIC | Age: 79
End: 2024-08-15

## 2024-08-15 VITALS
SYSTOLIC BLOOD PRESSURE: 176 MMHG | HEART RATE: 70 BPM | DIASTOLIC BLOOD PRESSURE: 79 MMHG | BODY MASS INDEX: 31.72 KG/M2 | OXYGEN SATURATION: 97 % | HEIGHT: 61 IN | WEIGHT: 168 LBS

## 2024-08-15 DIAGNOSIS — M79.10 MYALGIA, UNSPECIFIED SITE: ICD-10-CM

## 2024-08-15 DIAGNOSIS — M60.9 MYOSITIS, UNSPECIFIED: ICD-10-CM

## 2024-08-15 DIAGNOSIS — M51.36 OTHER INTERVERTEBRAL DISC DEGENERATION, LUMBAR REGION: ICD-10-CM

## 2024-08-15 PROCEDURE — 20552 NJX 1/MLT TRIGGER POINT 1/2: CPT

## 2024-08-15 PROCEDURE — 99204 OFFICE O/P NEW MOD 45 MIN: CPT | Mod: 25

## 2024-08-15 NOTE — PHYSICAL EXAM
[Normal] : Gait: normal [Tello's Sign] : negative Tello's sign [Pronator Drift] : negative pronator drift [SLR] : negative straight leg raise [de-identified] : 5 out of 5 motor strength, sensation is intact and symmetrical full range of motion flexion extension and rotation, no palpatory tenderness full range of motion of hips knees shoulders and elbows (all four extremities), no atrophy, negative straight leg raise, no pathological reflexes, no swelling, normal ambulation, no apparent distress skin is intact, no palpable lymph nodes, no upper or lower extremity instability, alert and oriented x3 and normal mood. Normal finger-to nose test.  No upper motor neuron findings. Left SI joint pain. [de-identified] : XR AP Lat Lumbar 08/15/2024 -adequate- reviewed with patient.    XR AP Pelvis 8/15/24 -adequate- reviewed with patient.

## 2024-08-15 NOTE — HISTORY OF PRESENT ILLNESS
[Stable] : stable [de-identified] : 79 year old female presents for initial evaluation of lower back pain with radiation to the bilateral buttocks x 3 weeks. S/P lumbar laminectomy in 2015 with me. She was doing well until a few weeks ago. Denies injury. She denies further radiation of pain down the legs. Denies numbness/tingling.  Getting up from a seated position aggravates the pain.  Takes ibuprofen for the pain. Tries to avoid NSAIDs due to colitis.  Has not tried PT or chiropractic care.  Denies SELVIN. She also notes that she has pain in the right groin.  PMHx: HTN, history of colon cancer s/p surgery in Sept 2020, s/p chemo and radiation. osteoporsis.  No fever, chills, sweats, nausea/vomiting. No bowel or bladder dysfunction, no recent weight loss or gain. No night pain. This history is in addition to the intake form that I personally reviewed.

## 2024-08-15 NOTE — ADDENDUM
[FreeTextEntry1] : This note was written by Poncho Pete on 08/15/2024 acting as scribe for Dr. Yo Gutiérrez M.D.  I, Yo Gutiérrez MD, have read and attest that all the information, medical decision making and discharge instructions within are true and accurate. DISPLAY PLAN FREE TEXT DISPLAY PLAN FREE TEXT DISPLAY PLAN FREE TEXT DISPLAY PLAN FREE TEXT

## 2024-08-15 NOTE — DISCUSSION/SUMMARY
[de-identified] : Left sacroiliitis. Discussed all options. I offered an injection after all risks were explained including allergic reaction to an infection under sterile conditions 1 mg of Depo-Medrol and 2 cc of 1% Lidocaine without epinephrine was injected into the painful site. The patient tolerated the procedure well and received significant relief following the injection. (left SI) All options discussed including rest, medicine, home exercise, acupuncture, Chiropractic care, Physical Therapy, Pain management, and last resort surgery. All questions were answered, all alternatives discussed, and the patient is in complete agreement with the treatment plan which the patient contributed to and discussed with me through the shared decision-making process. Follow-up appointment as instructed. Any issues and the patient will call or come in sooner.

## 2025-02-11 NOTE — PATIENT PROFILE ADULT - NSTRANSFERBELONGINGSRESP_GEN_A_NUR
yes Detail Level: Detailed Products Recommended: Elta md General Sunscreen Counseling: I recommended a broad spectrum sunscreen with a SPF of 30 or higher.  I explained that SPF 30 sunscreens block approximately 97 percent of the sun's harmful rays.  Sunscreens should be applied at least 15 minutes prior to expected sun exposure and then every 2 hours after that as long as sun exposure continues. If swimming or exercising sunscreen should be reapplied every 45 minutes to an hour after getting wet or sweating.  One ounce, or the equivalent of a shot glass full of sunscreen, is adequate to protect the skin not covered by a bathing suit. I also recommended a lip balm with a sunscreen as well. Sun protective clothing can be used in lieu of sunscreen but must be worn the entire time you are exposed to the sun's rays.

## 2025-05-12 NOTE — ASU DISCHARGE PLAN (ADULT/PEDIATRIC) - MODE OF TRANSPORTATION
.  LABS:                         10.7   3.53  )-----------( 111      ( 12 May 2025 02:33 )             33.0     05-12    138  |  107  |  27[H]  ----------------------------<  122[H]  3.9   |  26  |  1.51[H]    Ca    8.1[L]      12 May 2025 02:33    TPro  6.0[L]  /  Alb  2.5[L]  /  TBili  0.4  /  DBili  x   /  AST  29  /  ALT  18  /  AlkPhos  68  05-12      Urinalysis Basic - ( 12 May 2025 02:33 )    Color: x / Appearance: x / SG: x / pH: x  Gluc: 122 mg/dL / Ketone: x  / Bili: x / Urobili: x   Blood: x / Protein: x / Nitrite: x   Leuk Esterase: x / RBC: x / WBC x   Sq Epi: x / Non Sq Epi: x / Bacteria: x                RADIOLOGY, EKG & ADDITIONAL TESTS: Reviewed.
Wheelchair/Stroller

## 2025-06-16 NOTE — ASU PATIENT PROFILE, ADULT - NS PREOP UNDERSTANDS INFO
It has been prescribed by a different physician  For pain control we will continue to monitor      yes